# Patient Record
Sex: MALE | Race: WHITE | NOT HISPANIC OR LATINO | Employment: OTHER | URBAN - METROPOLITAN AREA
[De-identification: names, ages, dates, MRNs, and addresses within clinical notes are randomized per-mention and may not be internally consistent; named-entity substitution may affect disease eponyms.]

---

## 2017-04-15 ENCOUNTER — APPOINTMENT (OUTPATIENT)
Dept: RADIOLOGY | Facility: MEDICAL CENTER | Age: 52
End: 2017-04-15
Attending: EMERGENCY MEDICINE
Payer: COMMERCIAL

## 2017-04-15 ENCOUNTER — HOSPITAL ENCOUNTER (EMERGENCY)
Facility: MEDICAL CENTER | Age: 52
End: 2017-04-15
Attending: EMERGENCY MEDICINE
Payer: COMMERCIAL

## 2017-04-15 VITALS
RESPIRATION RATE: 18 BRPM | SYSTOLIC BLOOD PRESSURE: 124 MMHG | DIASTOLIC BLOOD PRESSURE: 72 MMHG | HEIGHT: 74 IN | BODY MASS INDEX: 29.71 KG/M2 | HEART RATE: 58 BPM | OXYGEN SATURATION: 97 % | WEIGHT: 231.48 LBS | TEMPERATURE: 98 F

## 2017-04-15 DIAGNOSIS — N23 URETERAL COLIC: ICD-10-CM

## 2017-04-15 LAB
ALBUMIN SERPL BCP-MCNC: 4.1 G/DL (ref 3.2–4.9)
ALBUMIN/GLOB SERPL: 1.4 G/DL
ALP SERPL-CCNC: 79 U/L (ref 30–99)
ALT SERPL-CCNC: 24 U/L (ref 2–50)
ANION GAP SERPL CALC-SCNC: 9 MMOL/L (ref 0–11.9)
APPEARANCE UR: CLEAR
AST SERPL-CCNC: 27 U/L (ref 12–45)
BASOPHILS # BLD AUTO: 0.8 % (ref 0–1.8)
BASOPHILS # BLD: 0.07 K/UL (ref 0–0.12)
BILIRUB SERPL-MCNC: 0.6 MG/DL (ref 0.1–1.5)
BILIRUB UR QL CFM: NEGATIVE
BUN SERPL-MCNC: 21 MG/DL (ref 8–22)
CALCIUM SERPL-MCNC: 9.1 MG/DL (ref 8.4–10.2)
CHLORIDE SERPL-SCNC: 106 MMOL/L (ref 96–112)
CO2 SERPL-SCNC: 26 MMOL/L (ref 20–33)
COLOR UR: YELLOW
CREAT SERPL-MCNC: 1.13 MG/DL (ref 0.5–1.4)
CULTURE IF INDICATED INDCX: NO UA CULTURE
EOSINOPHIL # BLD AUTO: 0.45 K/UL (ref 0–0.51)
EOSINOPHIL NFR BLD: 4.8 % (ref 0–6.9)
ERYTHROCYTE [DISTWIDTH] IN BLOOD BY AUTOMATED COUNT: 39.8 FL (ref 35.9–50)
GFR SERPL CREATININE-BSD FRML MDRD: >60 ML/MIN/1.73 M 2
GLOBULIN SER CALC-MCNC: 3 G/DL (ref 1.9–3.5)
GLUCOSE SERPL-MCNC: 126 MG/DL (ref 65–99)
GLUCOSE UR STRIP.AUTO-MCNC: NEGATIVE MG/DL
HCT VFR BLD AUTO: 44.6 % (ref 42–52)
HGB BLD-MCNC: 15.3 G/DL (ref 14–18)
IMM GRANULOCYTES # BLD AUTO: 0.02 K/UL (ref 0–0.11)
IMM GRANULOCYTES NFR BLD AUTO: 0.2 % (ref 0–0.9)
KETONES UR STRIP.AUTO-MCNC: ABNORMAL MG/DL
LEUKOCYTE ESTERASE UR QL STRIP.AUTO: NEGATIVE
LIPASE SERPL-CCNC: 20 U/L (ref 7–58)
LYMPHOCYTES # BLD AUTO: 2.61 K/UL (ref 1–4.8)
LYMPHOCYTES NFR BLD: 28 % (ref 22–41)
MCH RBC QN AUTO: 30.2 PG (ref 27–33)
MCHC RBC AUTO-ENTMCNC: 34.3 G/DL (ref 33.7–35.3)
MCV RBC AUTO: 88 FL (ref 81.4–97.8)
MICRO URNS: ABNORMAL
MONOCYTES # BLD AUTO: 0.74 K/UL (ref 0–0.85)
MONOCYTES NFR BLD AUTO: 7.9 % (ref 0–13.4)
MUCOUS THREADS #/AREA URNS HPF: ABNORMAL /HPF
NEUTROPHILS # BLD AUTO: 5.43 K/UL (ref 1.82–7.42)
NEUTROPHILS NFR BLD: 58.3 % (ref 44–72)
NITRITE UR QL STRIP.AUTO: NEGATIVE
NRBC # BLD AUTO: 0 K/UL
NRBC BLD AUTO-RTO: 0 /100 WBC
PH UR STRIP.AUTO: 5.5 [PH]
PLATELET # BLD AUTO: 239 K/UL (ref 164–446)
PMV BLD AUTO: 11.1 FL (ref 9–12.9)
POTASSIUM SERPL-SCNC: 4 MMOL/L (ref 3.6–5.5)
PROT SERPL-MCNC: 7.1 G/DL (ref 6–8.2)
PROT UR QL STRIP: NEGATIVE MG/DL
RBC # BLD AUTO: 5.07 M/UL (ref 4.7–6.1)
RBC # URNS HPF: ABNORMAL /HPF
RBC UR QL AUTO: ABNORMAL
SODIUM SERPL-SCNC: 141 MMOL/L (ref 135–145)
SP GR UR REFRACTOMETRY: 1.03
UNIDENT CRYS URNS QL MICRO: ABNORMAL /HPF
WBC # BLD AUTO: 9.3 K/UL (ref 4.8–10.8)
WBC #/AREA URNS HPF: ABNORMAL /HPF

## 2017-04-15 PROCEDURE — 96375 TX/PRO/DX INJ NEW DRUG ADDON: CPT

## 2017-04-15 PROCEDURE — 81001 URINALYSIS AUTO W/SCOPE: CPT

## 2017-04-15 PROCEDURE — 83690 ASSAY OF LIPASE: CPT

## 2017-04-15 PROCEDURE — 96374 THER/PROPH/DIAG INJ IV PUSH: CPT

## 2017-04-15 PROCEDURE — 74176 CT ABD & PELVIS W/O CONTRAST: CPT

## 2017-04-15 PROCEDURE — 85025 COMPLETE CBC W/AUTO DIFF WBC: CPT

## 2017-04-15 PROCEDURE — 700111 HCHG RX REV CODE 636 W/ 250 OVERRIDE (IP): Performed by: EMERGENCY MEDICINE

## 2017-04-15 PROCEDURE — 96376 TX/PRO/DX INJ SAME DRUG ADON: CPT

## 2017-04-15 PROCEDURE — 36415 COLL VENOUS BLD VENIPUNCTURE: CPT

## 2017-04-15 PROCEDURE — 700105 HCHG RX REV CODE 258: Performed by: EMERGENCY MEDICINE

## 2017-04-15 PROCEDURE — 96361 HYDRATE IV INFUSION ADD-ON: CPT

## 2017-04-15 PROCEDURE — 80053 COMPREHEN METABOLIC PANEL: CPT

## 2017-04-15 PROCEDURE — 99285 EMERGENCY DEPT VISIT HI MDM: CPT

## 2017-04-15 PROCEDURE — 94760 N-INVAS EAR/PLS OXIMETRY 1: CPT

## 2017-04-15 RX ORDER — ONDANSETRON 2 MG/ML
4 INJECTION INTRAMUSCULAR; INTRAVENOUS ONCE
Status: COMPLETED | OUTPATIENT
Start: 2017-04-15 | End: 2017-04-15

## 2017-04-15 RX ORDER — ONDANSETRON 4 MG/1
4 TABLET, ORALLY DISINTEGRATING ORAL EVERY 8 HOURS PRN
Qty: 20 TAB | Refills: 0 | Status: SHIPPED | OUTPATIENT
Start: 2017-04-15 | End: 2020-08-10

## 2017-04-15 RX ORDER — TAMSULOSIN HYDROCHLORIDE 0.4 MG/1
0.4 CAPSULE ORAL DAILY
Qty: 20 CAP | Refills: 0 | Status: SHIPPED | OUTPATIENT
Start: 2017-04-15 | End: 2020-08-10

## 2017-04-15 RX ORDER — MORPHINE SULFATE 4 MG/ML
4 INJECTION, SOLUTION INTRAMUSCULAR; INTRAVENOUS ONCE
Status: DISCONTINUED | OUTPATIENT
Start: 2017-04-15 | End: 2017-04-15

## 2017-04-15 RX ORDER — MORPHINE SULFATE 4 MG/ML
4 INJECTION, SOLUTION INTRAMUSCULAR; INTRAVENOUS ONCE
Status: COMPLETED | OUTPATIENT
Start: 2017-04-15 | End: 2017-04-15

## 2017-04-15 RX ORDER — SODIUM CHLORIDE 9 MG/ML
1000 INJECTION, SOLUTION INTRAVENOUS ONCE
Status: COMPLETED | OUTPATIENT
Start: 2017-04-15 | End: 2017-04-15

## 2017-04-15 RX ORDER — OXYCODONE HYDROCHLORIDE AND ACETAMINOPHEN 5; 325 MG/1; MG/1
1-2 TABLET ORAL EVERY 4 HOURS PRN
Qty: 20 TAB | Refills: 0 | Status: SHIPPED | OUTPATIENT
Start: 2017-04-15 | End: 2020-08-10

## 2017-04-15 RX ORDER — KETOROLAC TROMETHAMINE 30 MG/ML
30 INJECTION, SOLUTION INTRAMUSCULAR; INTRAVENOUS ONCE
Status: COMPLETED | OUTPATIENT
Start: 2017-04-15 | End: 2017-04-15

## 2017-04-15 RX ADMIN — KETOROLAC TROMETHAMINE 30 MG: 30 INJECTION, SOLUTION INTRAMUSCULAR; INTRAVENOUS at 12:52

## 2017-04-15 RX ADMIN — MORPHINE SULFATE 4 MG: 4 INJECTION INTRAVENOUS at 12:53

## 2017-04-15 RX ADMIN — ONDANSETRON 4 MG: 2 INJECTION, SOLUTION INTRAMUSCULAR; INTRAVENOUS at 12:52

## 2017-04-15 RX ADMIN — SODIUM CHLORIDE 1000 ML: 9 INJECTION, SOLUTION INTRAVENOUS at 12:52

## 2017-04-15 RX ADMIN — MORPHINE SULFATE 4 MG: 4 INJECTION INTRAVENOUS at 14:52

## 2017-04-15 ASSESSMENT — PAIN SCALES - GENERAL
PAINLEVEL_OUTOF10: 1
PAINLEVEL_OUTOF10: 5
PAINLEVEL_OUTOF10: 8

## 2017-04-15 NOTE — ED AVS SNAPSHOT
4/15/2017    Cody Wilson  [address] [place] [address]    Dear Cody:    [facility] wants to ensure your discharge home is safe and you or your loved ones have had all of your questions answered regarding your care after you leave the hospital.    Below is a list of resources and contact information should you have any questions regarding your hospital stay, follow-up instructions, or active medical symptoms.    Questions or Concerns Regarding… Contact   Medical Questions Related to Your Discharge  (7 days a week, 8am-5pm) Contact a Nurse Care Coordinator   [phone]   Medical Questions Not Related to Your Discharge  (24 hours a day / 7 days a week)  Contact the Nurse Health Line   [phone]    Medications or Discharge Instructions Refer to your discharge packet   or contact your [facility] Primary Care Provider   [phone]   Follow-up Appointment(s) Schedule your appointment via [employer]   or contact Scheduling [phone]   Billing Review your statement via [employer]  or contact Billing [phone]   Medical Records Review your records via [employer]   or contact Medical Records [phone]     You may receive a telephone call within two days of discharge. This call is to make certain you understand your discharge instructions and have the opportunity to have any questions answered. You can also easily access your medical information, test results and upcoming appointments via the [employer] free online health management tool. You can learn more and sign up at [employer]. For assistance setting up your [employer] account, please call [phone].    Once again, we want to ensure your discharge home is safe and that you have a clear understanding of any next steps in your care. If you have any questions or concerns, please do not hesitate to contact us, we are here for you. Thank you for choosing [facility] for your healthcare needs.    Sincerely,    Your [facility] Healthcare Team

## 2017-04-15 NOTE — ED PROVIDER NOTES
"ED Provider Note    CHIEF COMPLAINT  Chief Complaint   Patient presents with   • Abdominal Pain       HPI  Cody Wilson is a 51 y.o. male who presents with acute onset of severe left lower quadrant abdominal pain that began 45 minutes ago. The patient reports the pain is sharp and intermittent. He has never had pain like this before. He also had nausea and vomiting. He said his brother one month ago was diagnosed with kidney stones, the patient denies any history of kidney stones himself.    REVIEW OF SYSTEMS  See HPI for further details. All other systems are negative.     PAST MEDICAL HISTORY    none    SOCIAL HISTORY  Social History     Social History Main Topics   • Smoking status: Never Smoker    • Smokeless tobacco: Not on file   • Alcohol Use: No   • Drug Use: No   • Sexual Activity: Not on file       SURGICAL HISTORY  patient denies any surgical history    CURRENT MEDICATIONS  Home Medications     Reviewed by Kd Ascencio (Pharmacy Tech) on 04/15/17 at 1412  Med List Status: Complete    Medication Last Dose Status          Patient Gerardo Taking any Medications                          ALLERGIES  No Known Allergies    PHYSICAL EXAM  VITAL SIGNS: /72 mmHg  Pulse 52  Temp(Src) 36.7 °C (98 °F)  Resp 18  Ht 1.88 m (6' 2\")  Wt 105 kg (231 lb 7.7 oz)  BMI 29.71 kg/m2  SpO2 97% @ELENA[[id number]::@   Pulse ox interpretation: I interpret this pulse ox as normal.  Constitutional: Alert, appears uncomfortable from left lower quadrant abdominal pain.  HENT: No signs of trauma, Bilateral external ears normal, Nose normal.   Eyes: Pupils are equal and reactive, Conjunctiva normal, Non-icteric.   Neck: Normal range of motion, No tenderness, Supple, No stridor.   Lymphatic: No lymphadenopathy noted.   Cardiovascular: Regular rate and rhythm, no murmurs.   Thorax & Lungs: Normal breath sounds, No respiratory distress, No wheezing, No chest tenderness.   Abdomen: Bowel sounds normal, Soft, No tenderness, No " masses, No pulsatile masses. No peritoneal signs.  Skin: Warm, Dry, No erythema, No rash.   Extremities: Intact distal pulses, No edema, No tenderness, No cyanosis.  Musculoskeletal: Good range of motion in all major joints. No tenderness to palpation or major deformities noted.   Neurologic: Alert , Normal motor function, Normal sensory function, No focal deficits noted.   Psychiatric: Affect normal, Judgment normal, Mood normal.       DIAGNOSTIC STUDIES / PROCEDURES        LABS  Labs Reviewed   COMP METABOLIC PANEL - Abnormal; Notable for the following:     Glucose 126 (*)     All other components within normal limits   URINALYSIS,CULTURE IF INDICATED - Abnormal; Notable for the following:     Ketones Trace (*)     Occult Blood Large (*)     All other components within normal limits   URINE MICROSCOPIC (W/UA) - Abnormal; Notable for the following:     WBC Rare (*)     RBC 10-20 (*)     All other components within normal limits   CBC WITH DIFFERENTIAL   LIPASE   ESTIMATED GFR   REFRACTOMETER SG   UR BILI ICTOTEST         RADIOLOGY  CT-RENAL COLIC EVALUATION(A/P W/O)   Final Result         1.  3 mm distal left ureteral calculus with left-sided hydronephrosis.      2.  Cholelithiasis.              COURSE & MEDICAL DECISION MAKING  Pertinent Labs & Imaging studies reviewed. (See chart for details)    Differential diagnosis: Ureteral colic, diverticulitis, UTI, abdominal pain of unknown cause    IV was established, he was given 1 L normal saline IV for vomiting and dehydration. He was given Toradol 30 mg IV, Zofran 4 mg IV, and morphine 4 mg IV.    Patient was given additional morphine 4 g IV.    2:56 PM the patient was reassessed, he is feeling more comfortable. He'll be prescribed Flomax, Zofran, and Percocet. He will return to [facility] if he develops fever or worsening symptoms. He will follow-up with his doctor, I have given him a strainer if he would like to have the stone analyzed.    I reviewed prescription  monitoring program for patient's narcotic use before prescribing a scheduled drug.The patient will not drink alcohol nor drive with prescribed medications. The patient will return for new or worsening symptoms and is stable at the time of discharge.    The patient is referred to a primary physician for blood pressure management, diabetic screening, and for all other preventative health concerns.    DISPOSITION:  Patient will be discharged home in stable condition.    FOLLOW UP:  [facility], Emergency Dept  [address] [address] [phone]    If symptoms worsen, fever    Stuart Gunderson M.D.  [address] [id number] [phone]      As needed      OUTPATIENT MEDICATIONS:  New Prescriptions    ONDANSETRON (ZOFRAN ODT) 4 MG TABLET DISPERSIBLE    Take 1 Tab by mouth every 8 hours as needed for Nausea/Vomiting.    OXYCODONE-ACETAMINOPHEN (PERCOCET) 5-325 MG TAB    Take 1-2 Tabs by mouth every four hours as needed (pain). No driving, no drinking alcohol    TAMSULOSIN (FLOMAX) 0.4 MG CAPSULE    Take 1 Cap by mouth every day.           The patient will not drink alcohol nor drive with prescribed medications. The patient will return for worsening symptoms and is stable at the time of discharge. The patient verbalizes understanding and will comply.    FINAL IMPRESSION  1. Acute left ureteral colic  2.   3.         Electronically signed by: Abdiel Elise, 4/15/2017 1:23 PM

## 2017-04-15 NOTE — ED AVS SNAPSHOT
[employer] Access Code: [email or web address]  Expires: 5/15/2017  3:05 PM    Your email address is not on file at [facility]  Email Addresses are required for you to sign up for [employer], please contact [phone] to verify your personal information and to provide your email address prior to attempting to register for [employer].    Cody Wilson  [address] [place] [address] [place] [address]    Echo itt  A secure, online tool to manage your health information     [facility]s [employer]® is a secure, online tool that connects you to your personalized health information from the privacy of your home -- day or night - making it very easy for you to manage your healthcare. Once the activation process is completed, you can even access your medical information using the [employer] sebastian, which is available for free in the Apple Sebastian store or Google Play store.     To learn more about [employer], visit [email or web address]    There are two levels of access available (as shown below):   My Chart Features  [facility] Primary Care Doctor [facility]  Specialists [facility]  Urgent  Care Non-[facility] Primary Care Doctor   Email your healthcare team securely and privately 24/7 X X X    Manage appointments: schedule your next appointment; view details of past/upcoming appointments X      Request prescription refills. X      View recent personal medical records, including lab and immunizations X X X X   View health record, including health history, allergies, medications X X X X   Read reports about your outpatient visits, procedures, consult and ER notes X X X X   See your discharge summary, which is a recap of your hospital and/or ER visit that includes your diagnosis, lab results, and care plan X X  X     How to register for [employer]:  Once your e-mail address has been verified, follow the following steps to sign up for [employer].     1. Go to  [email or web address]  2. Click on the Sign Up Now box, which takes you to the New Member Sign Up page. You will need  to provide the following information:  a. Enter your [employer] Access Code exactly as it appears at the top of this page. (You will not need to use this code after you’ve completed the sign-up process. If you do not sign up before the expiration date, you must request a new code.)   b. Enter your date of birth.   c. Enter your home email address.   d. Click Submit, and follow the next screen’s instructions.  3. Create a [employer] ID. This will be your [employer] login ID and cannot be changed, so think of one that is secure and easy to remember.  4. Create a [employer] password. You can change your password at any time.  5. Enter your Password Reset Question and Answer. This can be used at a later time if you forget your password.   6. Enter your e-mail address. This allows you to receive e-mail notifications when new information is available in [employer].  7. Click Sign Up. You can now view your health information.    For assistance activating your [employer] account, call [phone]

## 2017-04-15 NOTE — ED AVS SNAPSHOT
Home Care Instructions                                                                                                                Cody Wilson   MRN: [id number]    Department:  [facility], Emergency Dept   Date of Visit:  4/15/2017            [facility], Emergency Dept    [address]    [place] [address]    Phone:  [phone]      You were seen by     Abdiel Elise M.D.      Your Diagnosis Was     Ureteral colic     N23       These are the medications you received during your hospitalization from 04/15/2017 1219 to 04/15/2017 1505     Date/Time Order Dose Route Action    04/15/2017 1252 NS infusion 1,000 mL 1,000 mL Intravenous New Bag    04/15/2017 1252 ketorolac (TORADOL) injection 30 mg Intravenous Given    04/15/2017 1253 morphine (pf) 4 mg/ml injection 4 mg 4 mg Intravenous Given    04/15/2017 1252 ondansetron (ZOFRAN) syringe/vial injection 4 mg 4 mg Intravenous Given    04/15/2017 1452 morphine (pf) 4 mg/ml injection 4 mg 4 mg Intravenous Given      Follow-up Information     1. Follow up with [facility], Emergency Dept.    Specialty:  Emergency Medicine    Why:  If symptoms worsen, fever    Contact information    [address] [address] [phone]        2. Follow up with Stuart Gunderson M.D..    Specialty:  Family Medicine    Why:  As needed    Contact information    [address] [address]  [place] [address] [phone]        Medication Information     Review all of your home medications and newly ordered medications with your primary doctor and/or pharmacist as soon as possible. Follow medication instructions as directed by your doctor and/or pharmacist.     Please keep your complete medication list with you and share with your physician. Update the information when medications are discontinued, doses are changed, or new medications (including over-the-counter products) are added; and carry  medication information at all times in the event of emergency situations.               Medication List      START taking these medications        Instructions    Morning Afternoon Evening Bedtime    ondansetron 4 MG Tbdp   Commonly known as:  ZOFRAN ODT        Take 1 Tab by mouth every 8 hours as needed for Nausea/Vomiting.   Dose:  4 mg                        oxycodone-acetaminophen 5-325 MG Tabs   Commonly known as:  PERCOCET        Take 1-2 Tabs by mouth every four hours as needed (pain). No driving, no drinking alcohol   Dose:  1-2 Tab                        tamsulosin 0.4 MG capsule   Commonly known as:  FLOMAX        Take 1 Cap by mouth every day.   Dose:  0.4 mg                             Where to Get Your Medications      You can get these medications from any pharmacy     Bring a paper prescription for each of these medications    - ondansetron 4 MG Tbdp  - oxycodone-acetaminophen 5-325 MG Tabs  - tamsulosin 0.4 MG capsule            Procedures and tests performed during your visit     CBC WITH DIFFERENTIAL    COMP METABOLIC PANEL    CT-RENAL COLIC EVALUATION(A/P W/O)    ESTIMATED GFR    LIPASE    REFRACTOMETER SG    UR BILI ICTOTEST    URINALYSIS CULTURE, IF INDICATED    URINE MICROSCOPIC (W/UA)        Discharge Instructions       Kidney Stones  Kidney stones (urolithiasis) are deposits that form inside your kidneys. The intense pain is caused by the stone moving through the urinary tract. When the stone moves, the ureter goes into spasm around the stone. The stone is usually passed in the urine.   CAUSES   · A disorder that makes certain neck glands produce too much parathyroid hormone (primary hyperparathyroidism).  · A buildup of uric acid crystals, similar to gout in your joints.  · Narrowing (stricture) of the ureter.  · A kidney obstruction present at birth (congenital obstruction).  · Previous surgery on the kidney or ureters.  · Numerous kidney infections.  SYMPTOMS   · Feeling sick to your  stomach (nauseous).  · Throwing up (vomiting).  · Blood in the urine (hematuria).  · Pain that usually spreads (radiates) to the groin.  · Frequency or urgency of urination.  DIAGNOSIS   · Taking a history and physical exam.  · Blood or urine tests.  · CT scan.  · Occasionally, an examination of the inside of the urinary bladder (cystoscopy) is performed.  TREATMENT   · Observation.  · Increasing your fluid intake.  · Extracorporeal shock wave lithotripsy--This is a noninvasive procedure that uses shock waves to break up kidney stones.  · Surgery may be needed if you have severe pain or persistent obstruction. There are various surgical procedures. Most of the procedures are performed with the use of small instruments. Only small incisions are needed to accommodate these instruments, so recovery time is minimized.  The size, location, and chemical composition are all important variables that will determine the proper choice of action for you. Talk to your health care provider to better understand your situation so that you will minimize the risk of injury to yourself and your kidney.   HOME CARE INSTRUCTIONS   · Drink enough water and fluids to keep your urine clear or pale yellow. This will help you to pass the stone or stone fragments.  · Strain all urine through the provided strainer. Keep all particulate matter and stones for your health care provider to see. The stone causing the pain may be as small as a grain of salt. It is very important to use the strainer each and every time you pass your urine. The collection of your stone will allow your health care provider to analyze it and verify that a stone has actually passed. The stone analysis will often identify what you can do to reduce the incidence of recurrences.  · Only take over-the-counter or prescription medicines for pain, discomfort, or fever as directed by your health care provider.  · Make a follow-up appointment with your health care provider as  directed.  · Get follow-up X-rays if required. The absence of pain does not always mean that the stone has passed. It may have only stopped moving. If the urine remains completely obstructed, it can cause loss of kidney function or even complete destruction of the kidney. It is your responsibility to make sure X-rays and follow-ups are completed. Ultrasounds of the kidney can show blockages and the status of the kidney. Ultrasounds are not associated with any radiation and can be performed easily in a matter of minutes.  SEEK MEDICAL CARE IF:  · You experience pain that is progressive and unresponsive to any pain medicine you have been prescribed.  SEEK IMMEDIATE MEDICAL CARE IF:   · Pain cannot be controlled with the prescribed medicine.  · You have a fever or shaking chills.  · The severity or intensity of pain increases over 18 hours and is not relieved by pain medicine.  · You develop a new onset of abdominal pain.  · You feel faint or pass out.  · You are unable to urinate.  MAKE SURE YOU:   · Understand these instructions.  · Will watch your condition.  · Will get help right away if you are not doing well or get worse.     This information is not intended to replace advice given to you by your health care provider. Make sure you discuss any questions you have with your health care provider.     Document Released: 12/18/2006 Document Revised: 01/08/2016 Document Reviewed: 05/21/2014  Elsevier Interactive Patient Education ©2016 [employer]            Patient Information     Patient Information    Following emergency treatment: all patient requiring follow-up care must return either to a private physician or a clinic if your condition worsens before you are able to obtain further medical attention, please return to the emergency room.     Billing Information    At [facility], we work to make the billing process streamlined for our patients.  Our Representatives are here to answer any questions you may have  regarding your hospital bill.  If you have insurance coverage and have supplied your insurance information to us, we will submit a claim to your insurer on your behalf.  Should you have any questions regarding your bill, we can be reached online or by phone as follows:  Online: You are able pay your bills online or live chat with our representatives about any billing questions you may have. We are here to help Monday - Friday from 8:00am to 7:30pm and 9:00am - 12:00pm on Saturdays.  Please visit [email or web address]  for more information.   Phone:  [phone] or [phone]    Please note that your emergency physician, surgeon, pathologist, radiologist, anesthesiologist, and other specialists are not employed by [facility] and will therefore bill separately for their services.  Please contact them directly for any questions concerning their bills at the numbers below:     Emergency Physician Services:  [phone]  [facility]:  [phone]  [facility]:  [phone]  [facility]:  [phone]    1. Your final bill may vary from the amount quoted upon discharge if all procedures are not complete at that time, or if your doctor has additional procedures of which we are not aware. You will receive an additional bill if you return to the Emergency Department at [facility] for suture removal regardless of the facility of which the sutures were placed.     2. Please arrange for settlement of this account at the emergency registration.    3. All self-pay accounts are due in full at the time of treatment.  If you are unable to meet this obligation then payment is expected within 4-5 days.     4. If you have had radiology studies (CT, X-ray, Ultrasound, MRI), you have received a preliminary result during your emergency department visit. Please contact the radiology department [phone] to receive a copy of your final result.  Please discuss the Final result with your primary physician or with the follow up physician provided.     Crisis Hotline:  [place] Crisis Hotline:  4-074-UQGHAPR or 1-820.672.7516  [place] Crisis Hotline:    1-390.245.7093 or 269-291-3995         ED Discharge Follow Up Questions    1. In order to provide you with very good care, we would like to follow up with a phone call in the next few days.  May we have your permission to contact you?     YES /  NO    2. What is the best phone number to call you? (       )_____-__________    3. What is the best time to call you?      Morning  /  Afternoon  /  Evening                   Patient Signature:  ____________________________________________________________    Date:  ____________________________________________________________

## 2017-04-15 NOTE — DISCHARGE INSTRUCTIONS
Kidney Stones  Kidney stones (urolithiasis) are deposits that form inside your kidneys. The intense pain is caused by the stone moving through the urinary tract. When the stone moves, the ureter goes into spasm around the stone. The stone is usually passed in the urine.   CAUSES   · A disorder that makes certain neck glands produce too much parathyroid hormone (primary hyperparathyroidism).  · A buildup of uric acid crystals, similar to gout in your joints.  · Narrowing (stricture) of the ureter.  · A kidney obstruction present at birth (congenital obstruction).  · Previous surgery on the kidney or ureters.  · Numerous kidney infections.  SYMPTOMS   · Feeling sick to your stomach (nauseous).  · Throwing up (vomiting).  · Blood in the urine (hematuria).  · Pain that usually spreads (radiates) to the groin.  · Frequency or urgency of urination.  DIAGNOSIS   · Taking a history and physical exam.  · Blood or urine tests.  · CT scan.  · Occasionally, an examination of the inside of the urinary bladder (cystoscopy) is performed.  TREATMENT   · Observation.  · Increasing your fluid intake.  · Extracorporeal shock wave lithotripsy--This is a noninvasive procedure that uses shock waves to break up kidney stones.  · Surgery may be needed if you have severe pain or persistent obstruction. There are various surgical procedures. Most of the procedures are performed with the use of small instruments. Only small incisions are needed to accommodate these instruments, so recovery time is minimized.  The size, location, and chemical composition are all important variables that will determine the proper choice of action for you. Talk to your health care provider to better understand your situation so that you will minimize the risk of injury to yourself and your kidney.   HOME CARE INSTRUCTIONS   · Drink enough water and fluids to keep your urine clear or pale yellow. This will help you to pass the stone or stone fragments.  · Strain  all urine through the provided strainer. Keep all particulate matter and stones for your health care provider to see. The stone causing the pain may be as small as a grain of salt. It is very important to use the strainer each and every time you pass your urine. The collection of your stone will allow your health care provider to analyze it and verify that a stone has actually passed. The stone analysis will often identify what you can do to reduce the incidence of recurrences.  · Only take over-the-counter or prescription medicines for pain, discomfort, or fever as directed by your health care provider.  · Make a follow-up appointment with your health care provider as directed.  · Get follow-up X-rays if required. The absence of pain does not always mean that the stone has passed. It may have only stopped moving. If the urine remains completely obstructed, it can cause loss of kidney function or even complete destruction of the kidney. It is your responsibility to make sure X-rays and follow-ups are completed. Ultrasounds of the kidney can show blockages and the status of the kidney. Ultrasounds are not associated with any radiation and can be performed easily in a matter of minutes.  SEEK MEDICAL CARE IF:  · You experience pain that is progressive and unresponsive to any pain medicine you have been prescribed.  SEEK IMMEDIATE MEDICAL CARE IF:   · Pain cannot be controlled with the prescribed medicine.  · You have a fever or shaking chills.  · The severity or intensity of pain increases over 18 hours and is not relieved by pain medicine.  · You develop a new onset of abdominal pain.  · You feel faint or pass out.  · You are unable to urinate.  MAKE SURE YOU:   · Understand these instructions.  · Will watch your condition.  · Will get help right away if you are not doing well or get worse.     This information is not intended to replace advice given to you by your health care provider. Make sure you discuss any  questions you have with your health care provider.     Document Released: 12/18/2006 Document Revised: 01/08/2016 Document Reviewed: 05/21/2014  [employer] Interactive Patient Education ©2016 [employer]

## 2017-04-15 NOTE — ED NOTES
Strainer given to pt , D/c pt home, rx given . Pt aware of f/u instructions , aware to return for any changes or concerns. No further questions upon d/c home from ed

## 2017-04-15 NOTE — ED NOTES
Reports acute onset of LLQ abdominal pain for the past 1/2 hour with one episode of nausea/vomiting.

## 2017-04-15 NOTE — ED NOTES
ERP at bedside. Pt agrees with plan of care discussed by ERP. AIDET acknowledged with patient. IV established. Blood sent to lab. Medicinal interventions carried out per ERP orders.  Dannyrjason in low position, side rail up for pt safety. Call light within reach. Will continue to monitor.

## 2017-04-27 ENCOUNTER — HOSPITAL ENCOUNTER (OUTPATIENT)
Facility: MEDICAL CENTER | Age: 52
End: 2017-04-27
Attending: FAMILY MEDICINE
Payer: COMMERCIAL

## 2017-04-27 PROCEDURE — 82365 CALCULUS SPECTROSCOPY: CPT

## 2017-04-28 ENCOUNTER — HOSPITAL ENCOUNTER (OUTPATIENT)
Dept: LAB | Facility: MEDICAL CENTER | Age: 52
End: 2017-04-28
Attending: FAMILY MEDICINE
Payer: COMMERCIAL

## 2017-04-28 LAB
ALBUMIN SERPL BCP-MCNC: 4.3 G/DL (ref 3.2–4.9)
ALBUMIN/GLOB SERPL: 1.5 G/DL
ALP SERPL-CCNC: 79 U/L (ref 30–99)
ALT SERPL-CCNC: 15 U/L (ref 2–50)
AMBIGUOUS DTTM AMBI4: NORMAL
ANION GAP SERPL CALC-SCNC: 5 MMOL/L (ref 0–11.9)
AST SERPL-CCNC: 16 U/L (ref 12–45)
BILIRUB SERPL-MCNC: 0.8 MG/DL (ref 0.1–1.5)
BUN SERPL-MCNC: 19 MG/DL (ref 8–22)
CALCIUM SERPL-MCNC: 9.2 MG/DL (ref 8.5–10.5)
CHLORIDE SERPL-SCNC: 108 MMOL/L (ref 96–112)
CHOLEST SERPL-MCNC: 158 MG/DL (ref 100–199)
CO2 SERPL-SCNC: 28 MMOL/L (ref 20–33)
CREAT SERPL-MCNC: 0.99 MG/DL (ref 0.5–1.4)
GFR SERPL CREATININE-BSD FRML MDRD: >60 ML/MIN/1.73 M 2
GLOBULIN SER CALC-MCNC: 2.9 G/DL (ref 1.9–3.5)
GLUCOSE SERPL-MCNC: 97 MG/DL (ref 65–99)
HDLC SERPL-MCNC: 46 MG/DL
LDLC SERPL CALC-MCNC: 91 MG/DL
POTASSIUM SERPL-SCNC: 4.3 MMOL/L (ref 3.6–5.5)
PROT SERPL-MCNC: 7.2 G/DL (ref 6–8.2)
SODIUM SERPL-SCNC: 141 MMOL/L (ref 135–145)
TRIGL SERPL-MCNC: 103 MG/DL (ref 0–149)

## 2017-04-28 PROCEDURE — 80061 LIPID PANEL: CPT

## 2017-04-28 PROCEDURE — 36415 COLL VENOUS BLD VENIPUNCTURE: CPT

## 2017-04-28 PROCEDURE — 80053 COMPREHEN METABOLIC PANEL: CPT

## 2017-05-03 LAB
APPEARANCE STONE: NORMAL
COMPN STONE: NORMAL
NUMBER STONE: 1
SIZE STONE: NORMAL MM
SPECIMEN WT: 16 MG

## 2017-06-01 ENCOUNTER — HOSPITAL ENCOUNTER (OUTPATIENT)
Dept: LAB | Facility: MEDICAL CENTER | Age: 52
End: 2017-06-01
Attending: PODIATRIST
Payer: COMMERCIAL

## 2017-06-01 LAB — URATE SERPL-MCNC: 7.1 MG/DL (ref 2.5–8.3)

## 2017-06-01 PROCEDURE — 36415 COLL VENOUS BLD VENIPUNCTURE: CPT

## 2017-06-01 PROCEDURE — 84550 ASSAY OF BLOOD/URIC ACID: CPT

## 2017-07-20 ENCOUNTER — APPOINTMENT (OUTPATIENT)
Dept: SOCIAL WORK | Facility: CLINIC | Age: 52
End: 2017-07-20

## 2017-07-20 PROCEDURE — 90736 HZV VACCINE LIVE SUBQ: CPT | Performed by: REGISTERED NURSE

## 2017-09-08 ENCOUNTER — HOSPITAL ENCOUNTER (OUTPATIENT)
Dept: RADIOLOGY | Facility: MEDICAL CENTER | Age: 52
End: 2017-09-08
Attending: FAMILY MEDICINE
Payer: COMMERCIAL

## 2017-09-08 DIAGNOSIS — M54.5 LOW BACK PAIN, UNSPECIFIED BACK PAIN LATERALITY, UNSPECIFIED CHRONICITY, WITH SCIATICA PRESENCE UNSPECIFIED: ICD-10-CM

## 2017-09-08 PROCEDURE — 72110 X-RAY EXAM L-2 SPINE 4/>VWS: CPT

## 2017-09-08 PROCEDURE — 72050 X-RAY EXAM NECK SPINE 4/5VWS: CPT

## 2017-09-23 ENCOUNTER — APPOINTMENT (OUTPATIENT)
Dept: LAB | Facility: MEDICAL CENTER | Age: 52
End: 2017-09-23
Attending: PEDIATRICS
Payer: COMMERCIAL

## 2017-11-02 ENCOUNTER — HOSPITAL ENCOUNTER (OUTPATIENT)
Dept: RADIOLOGY | Facility: MEDICAL CENTER | Age: 52
End: 2017-11-02
Attending: FAMILY MEDICINE
Payer: COMMERCIAL

## 2017-11-02 DIAGNOSIS — M54.5 LOW BACK PAIN, UNSPECIFIED BACK PAIN LATERALITY, UNSPECIFIED CHRONICITY, WITH SCIATICA PRESENCE UNSPECIFIED: ICD-10-CM

## 2017-11-02 PROCEDURE — 72148 MRI LUMBAR SPINE W/O DYE: CPT

## 2018-06-06 ENCOUNTER — HOSPITAL ENCOUNTER (EMERGENCY)
Facility: MEDICAL CENTER | Age: 53
End: 2018-06-06
Attending: EMERGENCY MEDICINE
Payer: COMMERCIAL

## 2018-06-06 ENCOUNTER — APPOINTMENT (OUTPATIENT)
Dept: RADIOLOGY | Facility: MEDICAL CENTER | Age: 53
End: 2018-06-06
Attending: EMERGENCY MEDICINE
Payer: COMMERCIAL

## 2018-06-06 VITALS
SYSTOLIC BLOOD PRESSURE: 124 MMHG | HEIGHT: 74 IN | TEMPERATURE: 98.3 F | RESPIRATION RATE: 16 BRPM | OXYGEN SATURATION: 95 % | DIASTOLIC BLOOD PRESSURE: 82 MMHG | HEART RATE: 89 BPM | WEIGHT: 232.81 LBS | BODY MASS INDEX: 29.88 KG/M2

## 2018-06-06 DIAGNOSIS — S61.215A LACERATION OF LEFT RING FINGER WITHOUT FOREIGN BODY WITHOUT DAMAGE TO NAIL, INITIAL ENCOUNTER: ICD-10-CM

## 2018-06-06 PROCEDURE — 90715 TDAP VACCINE 7 YRS/> IM: CPT | Performed by: EMERGENCY MEDICINE

## 2018-06-06 PROCEDURE — 73140 X-RAY EXAM OF FINGER(S): CPT | Mod: LT

## 2018-06-06 PROCEDURE — 99283 EMERGENCY DEPT VISIT LOW MDM: CPT

## 2018-06-06 PROCEDURE — 304999 HCHG REPAIR-SIMPLE/INTERMED LEVEL 1

## 2018-06-06 PROCEDURE — 303747 HCHG EXTRA SUTURE

## 2018-06-06 PROCEDURE — 700111 HCHG RX REV CODE 636 W/ 250 OVERRIDE (IP)

## 2018-06-06 PROCEDURE — 90471 IMMUNIZATION ADMIN: CPT

## 2018-06-06 PROCEDURE — 700111 HCHG RX REV CODE 636 W/ 250 OVERRIDE (IP): Performed by: EMERGENCY MEDICINE

## 2018-06-06 PROCEDURE — 304217 HCHG IRRIGATION SYSTEM

## 2018-06-06 PROCEDURE — 700101 HCHG RX REV CODE 250

## 2018-06-06 RX ORDER — LIDOCAINE HYDROCHLORIDE 20 MG/ML
20 INJECTION, SOLUTION INFILTRATION; PERINEURAL ONCE
Status: COMPLETED | OUTPATIENT
Start: 2018-06-06 | End: 2018-06-06

## 2018-06-06 RX ORDER — HYDROCODONE BITARTRATE AND ACETAMINOPHEN 7.5; 325 MG/1; MG/1
1 TABLET ORAL EVERY 6 HOURS PRN
Qty: 10 TAB | Refills: 0 | Status: SHIPPED | OUTPATIENT
Start: 2018-06-06 | End: 2018-06-11

## 2018-06-06 RX ORDER — BUPIVACAINE HYDROCHLORIDE 2.5 MG/ML
10 INJECTION, SOLUTION EPIDURAL; INFILTRATION; INTRACAUDAL ONCE
Status: COMPLETED | OUTPATIENT
Start: 2018-06-06 | End: 2018-06-06

## 2018-06-06 RX ORDER — BUPIVACAINE HYDROCHLORIDE 2.5 MG/ML
INJECTION, SOLUTION EPIDURAL; INFILTRATION; INTRACAUDAL
Status: COMPLETED
Start: 2018-06-06 | End: 2018-06-06

## 2018-06-06 RX ORDER — CEPHALEXIN 500 MG/1
500 CAPSULE ORAL 4 TIMES DAILY
Qty: 28 CAP | Refills: 0 | Status: SHIPPED | OUTPATIENT
Start: 2018-06-06 | End: 2018-06-13

## 2018-06-06 RX ORDER — LIDOCAINE HYDROCHLORIDE 20 MG/ML
INJECTION, SOLUTION INFILTRATION; PERINEURAL
Status: COMPLETED
Start: 2018-06-06 | End: 2018-06-06

## 2018-06-06 RX ADMIN — BUPIVACAINE HYDROCHLORIDE 10 ML: 2.5 INJECTION, SOLUTION EPIDURAL; INFILTRATION; INTRACAUDAL; PERINEURAL at 16:45

## 2018-06-06 RX ADMIN — LIDOCAINE HYDROCHLORIDE 20 ML: 20 INJECTION, SOLUTION INFILTRATION; PERINEURAL at 16:45

## 2018-06-06 RX ADMIN — BUPIVACAINE HYDROCHLORIDE 10 ML: 2.5 INJECTION, SOLUTION EPIDURAL; INFILTRATION; INTRACAUDAL at 16:45

## 2018-06-06 RX ADMIN — CLOSTRIDIUM TETANI TOXOID ANTIGEN (FORMALDEHYDE INACTIVATED), CORYNEBACTERIUM DIPHTHERIAE TOXOID ANTIGEN (FORMALDEHYDE INACTIVATED), BORDETELLA PERTUSSIS TOXOID ANTIGEN (GLUTARALDEHYDE INACTIVATED), BORDETELLA PERTUSSIS FILAMENTOUS HEMAGGLUTININ ANTIGEN (FORMALDEHYDE INACTIVATED), BORDETELLA PERTUSSIS PERTACTIN ANTIGEN, AND BORDETELLA PERTUSSIS FIMBRIAE 2/3 ANTIGEN 0.5 ML: 5; 2; 2.5; 5; 3; 5 INJECTION, SUSPENSION INTRAMUSCULAR at 16:45

## 2018-06-06 ASSESSMENT — PAIN SCALES - GENERAL: PAINLEVEL_OUTOF10: 8

## 2018-06-06 NOTE — ED PROVIDER NOTES
"ED Provider Note    CHIEF COMPLAINT  Chief Complaint   Patient presents with   • T-5000 Lacerations     table saw to left 5th finger. Sent from urgent care       HPI  Cody Wilson is a 52 y.o. male who presents for evaluation of finger laceration.  The patient was working with a table saw when he inadvertently cut his left 5th finger over the distal pad surface.  The patient was sent here from the urgent care for evaluation.  The patient denies any recent illness.  No other injuries or complaints.    REVIEW OF SYSTEMS  See HPI for further details.  He denies major health problems such as: Diabetes, seizures, cardiopulmonary disorders, gastrointestinal disorder;    PAST MEDICAL HISTORY  No past medical history on file.    FAMILY HISTORY  No family history on file.    SOCIAL HISTORY  Resides locally;    SURGICAL HISTORY  No past surgical history on file.    CURRENT MEDICATIONS  See nurse's notes    ALLERGIES  No Known Allergies    PHYSICAL EXAM  VITAL SIGNS: /82   Pulse 89   Temp 36.8 °C (98.3 °F)   Resp 16   Ht 1.88 m (6' 2\")   Wt 105.6 kg (232 lb 12.9 oz)   SpO2 95%   BMI 29.89 kg/m²    Constitutional: 52-year-old male, Well developed, Well nourished, sitting fairly comfortably   HENT: Normocephalic, Atraumatic,   Cardiovascular: Regular rate and rhythm without mumurs, gallups, rubs   Thorax & Lungs: Normal Equal breath sounds, No respiratory distress, No wheezing, no stridor, no rales. No chest tenderness.   Musculoskeletal: Left hand: No trauma identified except the 5th finger; left 5th finger: 2.5 cm laceration to the pad surface of the distal phalanx; the FDS and FDP are intact along with no pain with loading of the flexor tendons; decreased sensation over the ulnar aspect of the finger but is able to feel light touch;  Neurologic: Alert & oriented x 3,  No gross focal deficits noted.     RADIOLOGY/PROCEDURES  DX-FINGER(S) 2+ LEFT   Final Result      No evidence of bony injury.            COURSE & " MEDICAL DECISION MAKING  Pertinent Labs & Imaging studies reviewed. (See chart for details)  1.  Tetanus    Procedure note: A digital block with a combination of 1% lidocaine and 0.25% bupivacaine was utilized.  2 mL was infiltrated the side of the digit under sterile technique.  Good anesthesia was obtained.  The wound was irrigated with high-pressure normal saline.  Laceration was closed with 4-0 Ethilon.  6 interrupted sutures were placed.  The wound was cleansed and dressed.  No complications.    Discussion: At this time, the patient presents with a laceration to his 5th finger.  Treatment was initiated as noted above.  There was small defect distally and will need to heal by secondary intention.  Did speak with hand surgery on-call, Dr. Witt.  He will follow the patient up next week and reassess.  I discussed the findings and treatment plan with the patient.  He indicates he is comfortable with this explanation and disposition.      FINAL IMPRESSION  1. Laceration of left ring finger without foreign body without damage to nail, initial encounter        PLAN  1.  Appropriate discharge instructions given  2.  Keflex 500 mg #28  3.  Lortab 7.5 mg #10; [facility] databank was reviewed and informed consent obtained  5.  Follow-up with Dr. Witt next week    Electronically signed by: Guy G Gansert, 6/6/2018 4:38 PM

## 2018-06-06 NOTE — ED TRIAGE NOTES
Chief Complaint   Patient presents with   • T-5000 Lacerations     table saw to left 5th finger. Sent from urgent care     Ambulatory to triage for above. VSS. Unsure of last tdap. Explained triage process, to waiting room. Asked to inform RN if questions or concerns arise.

## 2018-06-07 NOTE — DISCHARGE INSTRUCTIONS
Fingertip Injuries and Amputations  Fingertip injuries are common and often get injured because they are last to escape when pulling your hand out of harm's way. You have amputated (cut off) part of your finger. How this turns out depends largely on how much was amputated. If just the tip is amputated, often the end of the finger will grow back and the finger may return to much the same as it was before the injury.   If more of the finger is missing, your caregiver has done the best with the tissue remaining to allow you to keep as much finger as is possible. Your caregiver after checking your injury has tried to leave you with a painless fingertip that has durable, feeling skin. If possible, your caregiver has tried to maintain the finger's length and appearance and preserve its fingernail.   Please read the instructions outlined below and refer to this sheet in the next few weeks. These instructions provide you with general information on caring for yourself. Your caregiver may also give you specific instructions. While your treatment has been done according to the most current medical practices available, unavoidable complications occasionally occur. If you have any problems or questions after discharge, please call your caregiver.  HOME CARE INSTRUCTIONS   · You may resume normal diet and activities as directed or allowed.  · Keep your hand elevated above the level of your heart. This helps decrease pain and swelling.  · Keep ice packs (or a bag of ice wrapped in a towel) on the injured area for 15-20 minutes, 3-4 times per day, for the first two days.  · Change dressings if necessary or as directed.  · Clean the wound daily or as directed.  · Only take over-the-counter or prescription medicines for pain, discomfort, or fever as directed by your caregiver.  · Keep appointments as directed.  SEEK IMMEDIATE MEDICAL CARE IF:  · You develop redness, swelling, numbness or increasing pain in the wound.  · There is pus  coming from the wound.  · You develop an unexplained oral temperature above 102° F (38.9° C) or as your caregiver suggests.  · There is a foul (bad) smell coming from the wound or dressing.  · There is a breaking open of the wound (edges not staying together) after sutures or staples have been removed.  MAKE SURE YOU:   · Understand these instructions.  · Will watch your condition.  · Will get help right away if you are not doing well or get worse.  Document Released: 11/08/2006 Document Revised: 03/11/2013 Document Reviewed: 10/07/2009  [employer]® Patient Information ©2014 [employer].      Facial Laceration  A facial laceration is a cut on the face. These injuries can be painful and cause bleeding. Some cuts may need to be closed with stitches (sutures), skin adhesive strips, or wound glue. Cuts usually heal quickly but can leave a scar. It can take 1-2 years for the scar to go away completely.  Follow these instructions at home:  · Only take medicines as told by your doctor.  · Follow your doctor's instructions for wound care.  For Stitches:  · Keep the cut clean and dry.  · If you have a bandage (dressing), change it at least once a day. Change the bandage if it gets wet or dirty, or as told by your doctor.  · Wash the cut with soap and water 2 times a day. Rinse the cut with water. Pat it dry with a clean towel.  · Put a thin layer of medicated cream on the cut as told by your doctor.  · You may shower after the first 24 hours. Do not soak the cut in water until the stitches are removed.  · Have your stitches removed as told by your doctor.  · Do not wear any makeup until a few days after your stitches are removed.  For Skin Adhesive Strips:  · Keep the cut clean and dry.  · Do not get the strips wet. You may take a bath, but be careful to keep the cut dry.  · If the cut gets wet, pat it dry with a clean towel.  · The strips will fall off on their own. Do not remove the strips that are still stuck to the  cut.  For Wound Glue:  · You may shower or take baths. Do not soak or scrub the cut. Do not swim. Avoid heavy sweating until the glue falls off on its own. After a shower or bath, pat the cut dry with a clean towel.  · Do not put medicine or makeup on your cut until the glue falls off.  · If you have a bandage, do not put tape over the glue.  · Avoid lots of sunlight or tanning lamps until the glue falls off.  · The glue will fall off on its own in 5-10 days. Do not pick at the glue.  After Healing:  · Put sunscreen on the cut for the first year to reduce your scar.  Contact a doctor if:  · You have a fever.  Get help right away if:  · Your cut area gets red, painful, or puffy (swollen).  · You see a yellowish-white fluid (pus) coming from the cut.  This information is not intended to replace advice given to you by your health care provider. Make sure you discuss any questions you have with your health care provider.  Document Released: 06/05/2009 Document Revised: 05/25/2017 Document Reviewed: 07/31/2014  [employer] Interactive Patient Education © 2017 [employer]

## 2018-06-07 NOTE — ED NOTES
Xrays and suturing complete. Wound dressed w/ abx ointment, adaptic, and tube gauze. Lac care reviewed w/ pt. Pt decline narcotic rx. Given rx for abx. Pt states understanding of dc instructions and f/u care. Pt able to amb out w/ steady gait.

## 2018-06-15 NOTE — DOCUMENTATION QUERY
DOCUMENTATION QUERY    PROVIDERS: Please select “Cosign w/ note”to reply to query.     To better represent the severity of illness of your patient, please review the following information and exercise your independent professional judgment in responding to this query.      Two different fingers are listed for the laceration site.  Fifth finger is documented many times in the ED report, but also Ring finger in the Impression portion of the ED report. Based upon the clinical findings, risk factors, and treatment, please specify the finger the laceration was on.     · Fifth finger  · Ring finger            The medical record reflects the following:   Clinical Findings Laceration of left ring finger without foreign body without damage to nail, initial encounter    Treatment    Risk Factors    Location within medical record  Impression      Thank you,   Kathleen Chandler

## 2018-06-19 NOTE — DOCUMENTATION QUERY
DOCUMENTATION QUERY   Dr. Gansert,     Query is being re-sent because the query was signed but not answered.      PROVIDERS: Please select “Cosign w/ note”to reply to query.     To better represent the severity of illness of your patient, please review the following information and exercise your independent professional judgment in responding to this query.      Two different fingers are listed for the laceration site.  Fifth finger is documented many times in the ED report, but also Ring finger in the Impression portion of the ED report. Based upon the clinical findings, risk factors, and treatment, please specify the finger the laceration was on.     · Fifth finger  · Ring finger            The medical record reflects the following:   Clinical Findings Laceration of left ring finger without foreign body without damage to nail, initial encounter    Treatment     Risk Factors     Location within medical record  Impression      Thank you,   Kathleen Chandler

## 2018-12-28 ENCOUNTER — HOSPITAL ENCOUNTER (OUTPATIENT)
Dept: LAB | Facility: MEDICAL CENTER | Age: 53
End: 2018-12-28
Attending: FAMILY MEDICINE
Payer: COMMERCIAL

## 2018-12-28 LAB
ALBUMIN SERPL BCP-MCNC: 4.2 G/DL (ref 3.2–4.9)
ALBUMIN/GLOB SERPL: 1.4 G/DL
ALP SERPL-CCNC: 81 U/L (ref 30–99)
ALT SERPL-CCNC: 25 U/L (ref 2–50)
ANION GAP SERPL CALC-SCNC: 7 MMOL/L (ref 0–11.9)
AST SERPL-CCNC: 21 U/L (ref 12–45)
BILIRUB SERPL-MCNC: 0.6 MG/DL (ref 0.1–1.5)
BUN SERPL-MCNC: 24 MG/DL (ref 8–22)
CALCIUM SERPL-MCNC: 9.9 MG/DL (ref 8.5–10.5)
CHLORIDE SERPL-SCNC: 106 MMOL/L (ref 96–112)
CO2 SERPL-SCNC: 28 MMOL/L (ref 20–33)
CREAT SERPL-MCNC: 0.97 MG/DL (ref 0.5–1.4)
FASTING STATUS PATIENT QL REPORTED: NORMAL
GLOBULIN SER CALC-MCNC: 3 G/DL (ref 1.9–3.5)
GLUCOSE SERPL-MCNC: 78 MG/DL (ref 65–99)
POTASSIUM SERPL-SCNC: 4.3 MMOL/L (ref 3.6–5.5)
PROT SERPL-MCNC: 7.2 G/DL (ref 6–8.2)
SODIUM SERPL-SCNC: 141 MMOL/L (ref 135–145)

## 2018-12-28 PROCEDURE — 84443 ASSAY THYROID STIM HORMONE: CPT

## 2018-12-28 PROCEDURE — 84439 ASSAY OF FREE THYROXINE: CPT

## 2018-12-28 PROCEDURE — 36415 COLL VENOUS BLD VENIPUNCTURE: CPT

## 2018-12-28 PROCEDURE — 80053 COMPREHEN METABOLIC PANEL: CPT

## 2018-12-29 LAB
T4 FREE SERPL-MCNC: 0.69 NG/DL (ref 0.53–1.43)
TSH SERPL DL<=0.005 MIU/L-ACNC: 2.29 UIU/ML (ref 0.38–5.33)

## 2019-01-04 ENCOUNTER — HOSPITAL ENCOUNTER (OUTPATIENT)
Dept: LAB | Facility: MEDICAL CENTER | Age: 54
End: 2019-01-04
Attending: FAMILY MEDICINE
Payer: COMMERCIAL

## 2019-01-04 LAB
ALBUMIN SERPL BCP-MCNC: 4.1 G/DL (ref 3.2–4.9)
ALBUMIN/GLOB SERPL: 1.4 G/DL
ALP SERPL-CCNC: 79 U/L (ref 30–99)
ALT SERPL-CCNC: 19 U/L (ref 2–50)
ANION GAP SERPL CALC-SCNC: 6 MMOL/L (ref 0–11.9)
AST SERPL-CCNC: 18 U/L (ref 12–45)
BILIRUB SERPL-MCNC: 0.8 MG/DL (ref 0.1–1.5)
BUN SERPL-MCNC: 20 MG/DL (ref 8–22)
CALCIUM SERPL-MCNC: 9.9 MG/DL (ref 8.5–10.5)
CHLORIDE SERPL-SCNC: 108 MMOL/L (ref 96–112)
CHOLEST SERPL-MCNC: 157 MG/DL (ref 100–199)
CO2 SERPL-SCNC: 27 MMOL/L (ref 20–33)
CREAT SERPL-MCNC: 1.05 MG/DL (ref 0.5–1.4)
FASTING STATUS PATIENT QL REPORTED: NORMAL
GLOBULIN SER CALC-MCNC: 3 G/DL (ref 1.9–3.5)
GLUCOSE SERPL-MCNC: 101 MG/DL (ref 65–99)
HDLC SERPL-MCNC: 44 MG/DL
LDLC SERPL CALC-MCNC: 103 MG/DL
POTASSIUM SERPL-SCNC: 4.5 MMOL/L (ref 3.6–5.5)
PROT SERPL-MCNC: 7.1 G/DL (ref 6–8.2)
SODIUM SERPL-SCNC: 141 MMOL/L (ref 135–145)
TRIGL SERPL-MCNC: 51 MG/DL (ref 0–149)

## 2019-01-04 PROCEDURE — 36415 COLL VENOUS BLD VENIPUNCTURE: CPT

## 2019-01-04 PROCEDURE — 80061 LIPID PANEL: CPT

## 2019-01-04 PROCEDURE — 80053 COMPREHEN METABOLIC PANEL: CPT

## 2019-02-05 ENCOUNTER — APPOINTMENT (OUTPATIENT)
Dept: BEHAVIORAL HEALTH | Facility: CLINIC | Age: 54
End: 2019-02-05
Payer: COMMERCIAL

## 2019-11-18 ENCOUNTER — OFFICE VISIT (OUTPATIENT)
Dept: BEHAVIORAL HEALTH | Facility: CLINIC | Age: 54
End: 2019-11-18
Payer: COMMERCIAL

## 2019-11-18 DIAGNOSIS — F33.1 MODERATE EPISODE OF RECURRENT MAJOR DEPRESSIVE DISORDER (HCC): ICD-10-CM

## 2019-11-18 PROCEDURE — 90791 PSYCH DIAGNOSTIC EVALUATION: CPT | Performed by: PSYCHOLOGIST

## 2019-11-18 NOTE — BH THERAPY
[facility]  INITIAL ASSESSMENT    Name: Cody Wilson  MRN: [id number]  : 1965  Age: 53 y.o.  Date of assessment: 2019  PCP: Wilfrido Salas D.O.  Persons in attendance: Patient  Total session time: 45 minutes      CHIEF COMPLAINT AND HISTORY OF PRESENTING PROBLEM:  (as stated by Patient):  Cody Wilson is a 53 year old, white male  The patient self-referred and voluntarily presented for an individual intake to address depression, which he stated has been getting worse. He reported having episodes of depression since he was about [age] years old. He also reported that he has had some issues with anger, but denied any problems as a result. Patient denied any history of mental health treatment. Patient identified several stresses including being laid off in . He is currently self-employed as a . Patient stated that there has been an increase in marital stress which he related to him not being steadily employed which has been resulting in financial stress. Reviewed limits of confidentiality and [facility] policies; patient expressed understanding and agreed to voluntarily proceed with evaluation and treatment.      Primary presenting issue includes   Chief Complaint   Patient presents with   • Depression   • Stress   • Sleep Problem   • Initial  Evaluation       FAMILY/SOCIAL HISTORY  Current living situation/household members: Patient currently lives with his wife of twenty-four years. Also living at home are two daughters.  Relevant family history/structure/dynamics: Both parents are . Father  in  of M.I. Mother  in  of heart disease. He has two brothers and one sister.   Current family/social stressors: Financial stress  Quality/quantity of current family and/or social support: Patient stated that he does not have much of a support system.  Does patient/parent report a family history of behavioral health issues,  diagnoses, or treatment? Yes  Family History   Problem Relation Age of Onset   • Bipolar disorder Mother    • Depression Mother    • Depression Sister    • Dementia Paternal Grandmother         BEHAVIORAL HEALTH TREATMENT HISTORY  Does patient/parent report a history of prior behavioral health treatment for patient? No:    History of untreated behavioral health issues identified? Yes    MEDICAL HISTORY   Past medical/surgical history:   Past Medical History:   Diagnosis Date   • Depression       Past Surgical History:   Procedure Laterality Date   • HERNIA REPAIR          Medication Allergies:  Patient has no known allergies.   Medical history provided by patient during current evaluation: Patient's medical history is well documented in this medical record.    Patient reports last physical exam: 2018  Does patient/parent report any history of or current developmental concerns? No  Does patient/parent report nutritional concerns? No  Does patient/parent report change in appetite or weight loss/gain? No  Does patient/parent report history of eating disorder symptoms? No  Does patient/parent report dental problem? No  Does patient/parent report physical pain? Yes   Indicate if pain is acute or chronic, and location: Arthritis   Pain scale rating:  varies     Does patient/parent report functional impact of medical, developmental, or pain issues?   N\A    EDUCATIONAL/LEARNING HISTORY  Is patient currently enrolled in a school/educational program? No:     Highest grade level completed: G.E.D. an A.A. in photography  School performance/functioning: average  History of Special Education/repeated grades/learning issues: no  Preferred learning style: auditory/visual  Current learning needs (large print, language barrier, etc):  None    EMPLOYMENT/RESOURCES  Is the patient currently employed? Yes, self-employed   Does the patient/parent report adequate financial resources? Yes  Does patient identify impact of  presenting issue on work functioning? Yes  Work or income-related stressors:  Financial stress     HISTORY:  Does patient report current or past enlistment? No    [If yes, complete below items]  Does patient report history of exposure to combat? No  Does patient report history of  sexual trauma? No  Does patient report other -related stressors? No    SPIRITUAL/CULTURAL/IDENTITY:  What are the patient’s/family’s spiritual beliefs or practices? Presybeterian  What is the patient’s cultural or ethnic background/identity?   How does the patient identify their sexual orientation? heterosexual  How does the patient identify their gender? male  Does the patient identify any spiritual/cultural/identity factors as relevant to the presenting issue? No    LEGAL HISTORY  Has the patient ever been involved with juvenile, adult, or family legal systems? No   [If yes, trigger section below:]  Does patient report ever being a victim of a crime?  No  Does patient report involvement in any current legal issues?  No  Does patient report ever being arrested or committing a crime? No  Does patient report any current agency (parole/probation/CPS/) involvement? No    ABUSE/NEGLECT/TRAUMA SCREENING  Does patient report feeling “unsafe” in his/her home, or afraid of anyone? No  Does patient report any history of physical, sexual, or emotional abuse? No  Does parent or significant other report any of the above? No  Is there evidence of neglect by self? No  Is there evidence of neglect by a caregiver? No  Does the patient/parent report any history of CPS/APS/police involvement related to suspected abuse/neglect or domestic violence? No  Does the patient/parent report any other history of potentially traumatic life events? No  Based on the information provided during the current assessment, is a mandated report of suspected abuse/neglect being made?  No     SAFETY ASSESSMENT - SELF  Does patient  acknowledge current or past symptoms of dangerousness to self? No  Does parent/significant other report patient has current or past symptoms of dangerousness to self? No      Recent change in frequency/specificity/intensity of suicidal thoughts or self-harm behavior? No  Current access to firearms, medications, or other identified means of suicide/self-harm? Yes  If yes, willing to restrict access to means of suicide/self-harm? not indicated  Protective factors present: Fear of suicide, Future-oriented, Good impulse control, Hopefulness, Moral objection to suicide, Optimism, Positive coping skills, Positive self-efficacy, Spiritual beliefs/practices and Strong socia/community connections    Current Suicide Risk: Low  Crisis Safety Plan completed and copy given to patient: No, but reviewed safety plan with patient.    SAFETY ASSESSMENT - OTHERS  Does paor past symptoms of aggressive behavior or risk to others? No  Does parent/significant othtient acknowledge current or past symptoms of aggressive behavior or risk to others? No  Does parent/significant other report patient has current or past symptoms of aggressive behavior or risk to others? No    Recent change in frequency/specificity/intensity of thoughts or threats to harm others? No  Current access to firearms/other identified means of harm? Yes  If yes, willing to restrict access to weapons/means of harm? not indicated  Protective factors present: Good frustration tolerance, Fear of consequences, Moral/spiritual prohibition, Guilt/remorse for past aggression, Well-developed sense of empathy, Positive impulse-control, Stable relationships and Low rumination/obsession    Current Homicide Risk:  Low  Crisis Safety Plan completed and copy given to patient? No  Based on information provided during the current assessment, is a mandated “duty to warn” being exercised? No    SUBSTANCE USE/ADDICTION HISTORY  [] Not applicable - patient 10 years of age or younger    Is  there a family history of substance use/addiction? No  Does patient acknowledge or parent/significant other report use of/dependence on substances? No  Last time patient used alcohol: denied  Within the past week? No  Last time patient used marijuana: denied  Within the past month? No  Any other street drugs ever tried even once? No  Any use of prescription medications/pills without a prescription, or for reasons others than originally prescribed?  No  Any other addictive behavior reported (gambling, shopping, sex)? No     Drug History:  Amphetamine:  Amphetamine frequency: Never used    Cannibis:  Cannabis frequency: Never used    Cocaine:  Cocaine frequency: Never used    Ecstasy:  Ecstasy frequency: Never used    Hallucinogen:  Hallucinogen frequency: Never used    Inhalant:   Inhalant frequency: Never used    Opiate:  Opiate frequency: Never used  Cannabis frequency: Never used    Other:  Other drug frequency: Never used    Sedative:   Sedative frequency: Never used      What consequences does the patient associate with any of the above substance use and or addictive behaviors? None    Patient’s motivation/readiness for change: N/A    [x] Patient denies use of any substance/addictive behaviors    STRENGTHS/ASSETS  Strengths Identified by interviewer: Insight into problems, Evidence of good judgement, Self-awareness, Social support, Stable relationships, Optimism, Problem-solving skills, Sense of humor and Social skills  Strengths Identified by patient: Motivated for treatment, spiritual    MENTAL STATUS/OBSERVATIONS   Participation: Active verbal participation, Attentive, Engaged and Open to feedback  Grooming: Casual and Neat  Orientation:Alert and Fully Oriented   Behavior: Calm  Eye contact: Good   Mood:Depressed  Affect:Flexible and Congruent with content  Thought process: Logical and Goal-directed  Thought content:  Within normal limits  Speech: Rate within normal limits and Volume within normal  limits  Perception: Within normal limits  Memory: No gross evidence of memory deficits  Insight: Good  Judgment:  Good  Other:    Family/couple interaction observations: N/A      CLINICAL FORMULATION:   Cody Wilson is a 53 year old, white male  The patient self-referred and voluntarily presented for an individual intake to address depression, which he stated has been getting worse. He reported having episodes of depression since he was about [age] years old. He also reported that he has had some issues with anger, but denied any problems as a result. Patient denied any history of mental health treatment. Patient identified several stresses including being laid off in 2014. He is currently self-employed as a . Patient stated that there has been an increase in marital stress which he related to him not being steadily employed which has been resulting in financial stress.    Patient did not present in acute distress. Patient was appropriately groomed and cooperative. Patient was alert and oriented to person, place, and time. Eye contact was appropriate. No abnormalities in attention or concentration were noted. No abnormalities of movement present; psychomotor activity was normal. Speech was fluent and regular in rhythm, rate, volume, and tone. Thought processes were linear, logical, and goal-directed. There was no evidence of thought disorder. No auditory or visual hallucinations. Long and short term memory appeared to be intact. Insight, judgment, and impulse control were deemed to be within normal limits. Reported mood was fairlynegative. Affect was was generally blunted with an obvious reduction in affective expression and congruent with thought content and conversation. Patient denied current suicidal and homicidal ideation or plan, intent, and preparatory behavior.      DIAGNOSTIC IMPRESSION(S):  1. Moderate episode of recurrent major depressive disorder (HCC)        IDENTIFIED NEEDS/PLAN:   [If any of these marked, trigger DISPOSITION list]  Mood/anxiety  Actively being addressed by [facility]     PLAN/DISPOSITION:    1) The patient will return to the clinic 2 weeks.  2) Crisis Response Plan:  Reviewed emergency resources with the patient and the patient expressed understanding including:  If feeling suicidal, patient will call or present to the Behavioral Health Clinic during duty hours or present to closest ED ([facility] or [facility], call 911 or crisis hotline (7-368-525-XAIT) after duty hours.  3) Referrals/Consults:  N/A  4) Barriers to Learning:  No  5) Readiness to Learn:  Yes  6) Cultural Concerns:  No  7) Patient voiced understanding of, and agreement with, plan and goals as annotated above.  8) Declare these services are medically necessary and appropriate to the patient’s diagnosis and needs  9) The point of contact at the [facility] regarding this evaluation is Dr. Wadsworth, Psychologist.      Does patient express agreement with the above plan? Yes     Referral appointment(s) scheduled? No       Demario Wadsworth, Ph.D.

## 2019-12-23 ENCOUNTER — APPOINTMENT (OUTPATIENT)
Dept: BEHAVIORAL HEALTH | Facility: CLINIC | Age: 54
End: 2019-12-23
Payer: COMMERCIAL

## 2020-04-30 ENCOUNTER — HOSPITAL ENCOUNTER (OUTPATIENT)
Dept: LAB | Facility: MEDICAL CENTER | Age: 55
End: 2020-04-30
Attending: FAMILY MEDICINE
Payer: COMMERCIAL

## 2020-04-30 LAB
ALBUMIN SERPL BCP-MCNC: 4.1 G/DL (ref 3.2–4.9)
ALBUMIN/GLOB SERPL: 1.5 G/DL
ALP SERPL-CCNC: 89 U/L (ref 30–99)
ALT SERPL-CCNC: 18 U/L (ref 2–50)
ANION GAP SERPL CALC-SCNC: 11 MMOL/L (ref 7–16)
AST SERPL-CCNC: 26 U/L (ref 12–45)
BILIRUB SERPL-MCNC: 0.7 MG/DL (ref 0.1–1.5)
BUN SERPL-MCNC: 17 MG/DL (ref 8–22)
CALCIUM SERPL-MCNC: 9.1 MG/DL (ref 8.5–10.5)
CHLORIDE SERPL-SCNC: 106 MMOL/L (ref 96–112)
CHOLEST SERPL-MCNC: 157 MG/DL (ref 100–199)
CO2 SERPL-SCNC: 25 MMOL/L (ref 20–33)
CREAT SERPL-MCNC: 0.92 MG/DL (ref 0.5–1.4)
EST. AVERAGE GLUCOSE BLD GHB EST-MCNC: 111 MG/DL
FASTING STATUS PATIENT QL REPORTED: NORMAL
GLOBULIN SER CALC-MCNC: 2.7 G/DL (ref 1.9–3.5)
GLUCOSE SERPL-MCNC: 99 MG/DL (ref 65–99)
HBA1C MFR BLD: 5.5 % (ref 0–5.6)
HDLC SERPL-MCNC: 44 MG/DL
LDLC SERPL CALC-MCNC: 97 MG/DL
POTASSIUM SERPL-SCNC: 4 MMOL/L (ref 3.6–5.5)
PROT SERPL-MCNC: 6.8 G/DL (ref 6–8.2)
SODIUM SERPL-SCNC: 142 MMOL/L (ref 135–145)
TRIGL SERPL-MCNC: 78 MG/DL (ref 0–149)

## 2020-04-30 PROCEDURE — 83036 HEMOGLOBIN GLYCOSYLATED A1C: CPT

## 2020-04-30 PROCEDURE — 36415 COLL VENOUS BLD VENIPUNCTURE: CPT

## 2020-04-30 PROCEDURE — 80061 LIPID PANEL: CPT

## 2020-04-30 PROCEDURE — 80053 COMPREHEN METABOLIC PANEL: CPT

## 2020-08-10 ENCOUNTER — APPOINTMENT (OUTPATIENT)
Dept: ADMISSIONS | Facility: MEDICAL CENTER | Age: 55
End: 2020-08-10
Payer: COMMERCIAL

## 2020-08-10 DIAGNOSIS — Z01.812 PRE-OPERATIVE LABORATORY EXAMINATION: ICD-10-CM

## 2020-08-10 LAB
COVID ORDER STATUS COVID19: NORMAL
SARS-COV-2 RNA RESP QL NAA+PROBE: NOTDETECTED
SPECIMEN SOURCE: NORMAL

## 2020-08-10 PROCEDURE — U0003 INFECTIOUS AGENT DETECTION BY NUCLEIC ACID (DNA OR RNA); SEVERE ACUTE RESPIRATORY SYNDROME CORONAVIRUS 2 (SARS-COV-2) (CORONAVIRUS DISEASE [COVID-19]), AMPLIFIED PROBE TECHNIQUE, MAKING USE OF HIGH THROUGHPUT TECHNOLOGIES AS DESCRIBED BY CMS-2020-01-R: HCPCS

## 2020-08-10 RX ORDER — FLUTICASONE PROPIONATE 50 MCG
2 SPRAY, SUSPENSION (ML) NASAL DAILY
COMMUNITY
Start: 2020-05-26 | End: 2020-08-10

## 2020-08-10 RX ORDER — PANTOPRAZOLE SODIUM 40 MG/1
40 TABLET, DELAYED RELEASE ORAL DAILY
COMMUNITY
Start: 2020-06-22 | End: 2020-08-10

## 2020-08-10 RX ORDER — IBUPROFEN 200 MG
400 TABLET ORAL EVERY 6 HOURS PRN
COMMUNITY
End: 2023-03-04

## 2020-08-10 SDOH — HEALTH STABILITY: MENTAL HEALTH: HOW OFTEN DO YOU HAVE A DRINK CONTAINING ALCOHOL?: NEVER

## 2020-08-10 SDOH — HEALTH STABILITY: MENTAL HEALTH: HOW OFTEN DO YOU HAVE 6 OR MORE DRINKS ON ONE OCCASION?: NEVER

## 2020-08-10 NOTE — OR NURSING
"Pre-admit appointment completed. \"Preparing for your procedure\" sheet given to pt along with verbal and written instructions.     Pt denies any problems with anesthesia.    Pt to get COVID test today.           Pt given instructions to self isolate after COVID test and to contact doctor if any symptoms of COVID occur.  "

## 2020-08-12 ENCOUNTER — ANESTHESIA EVENT (OUTPATIENT)
Dept: SURGERY | Facility: MEDICAL CENTER | Age: 55
End: 2020-08-12
Payer: COMMERCIAL

## 2020-08-13 ENCOUNTER — ANESTHESIA (OUTPATIENT)
Dept: SURGERY | Facility: MEDICAL CENTER | Age: 55
End: 2020-08-13
Payer: COMMERCIAL

## 2020-08-13 ENCOUNTER — HOSPITAL ENCOUNTER (OUTPATIENT)
Facility: MEDICAL CENTER | Age: 55
End: 2020-08-13
Attending: INTERNAL MEDICINE | Admitting: INTERNAL MEDICINE
Payer: COMMERCIAL

## 2020-08-13 VITALS
RESPIRATION RATE: 16 BRPM | OXYGEN SATURATION: 94 % | HEART RATE: 50 BPM | DIASTOLIC BLOOD PRESSURE: 78 MMHG | BODY MASS INDEX: 29.45 KG/M2 | WEIGHT: 229.5 LBS | SYSTOLIC BLOOD PRESSURE: 114 MMHG | TEMPERATURE: 97 F | HEIGHT: 74 IN

## 2020-08-13 PROBLEM — K21.9 GERD (GASTROESOPHAGEAL REFLUX DISEASE): Status: ACTIVE | Noted: 2020-08-13

## 2020-08-13 LAB — PATHOLOGY CONSULT NOTE: NORMAL

## 2020-08-13 PROCEDURE — 88342 IMHCHEM/IMCYTCHM 1ST ANTB: CPT

## 2020-08-13 PROCEDURE — 160009 HCHG ANES TIME/MIN: Performed by: INTERNAL MEDICINE

## 2020-08-13 PROCEDURE — 160203 HCHG ENDO MINUTES - 1ST 30 MINS LEVEL 4: Performed by: INTERNAL MEDICINE

## 2020-08-13 PROCEDURE — 160002 HCHG RECOVERY MINUTES (STAT): Performed by: INTERNAL MEDICINE

## 2020-08-13 PROCEDURE — 160025 RECOVERY II MINUTES (STATS): Performed by: INTERNAL MEDICINE

## 2020-08-13 PROCEDURE — 501629 HCHG TUBE, LUKI TRAP STERILE DISP: Performed by: INTERNAL MEDICINE

## 2020-08-13 PROCEDURE — 160048 HCHG OR STATISTICAL LEVEL 1-5: Performed by: INTERNAL MEDICINE

## 2020-08-13 PROCEDURE — 160035 HCHG PACU - 1ST 60 MINS PHASE I: Performed by: INTERNAL MEDICINE

## 2020-08-13 PROCEDURE — 500066 HCHG BITE BLOCK, ECT: Performed by: INTERNAL MEDICINE

## 2020-08-13 PROCEDURE — 700101 HCHG RX REV CODE 250

## 2020-08-13 PROCEDURE — 160046 HCHG PACU - 1ST 60 MINS PHASE II: Performed by: INTERNAL MEDICINE

## 2020-08-13 PROCEDURE — 502240 HCHG MISC OR SUPPLY RC 0272: Performed by: INTERNAL MEDICINE

## 2020-08-13 PROCEDURE — 700111 HCHG RX REV CODE 636 W/ 250 OVERRIDE (IP): Performed by: ANESTHESIOLOGY

## 2020-08-13 PROCEDURE — 160208 HCHG ENDO MINUTES - EA ADDL 1 MIN LEVEL 4: Performed by: INTERNAL MEDICINE

## 2020-08-13 PROCEDURE — 88341 IMHCHEM/IMCYTCHM EA ADD ANTB: CPT | Mod: 91

## 2020-08-13 PROCEDURE — 700105 HCHG RX REV CODE 258: Performed by: INTERNAL MEDICINE

## 2020-08-13 PROCEDURE — 88305 TISSUE EXAM BY PATHOLOGIST: CPT

## 2020-08-13 PROCEDURE — 700101 HCHG RX REV CODE 250: Performed by: ANESTHESIOLOGY

## 2020-08-13 RX ORDER — HYDROMORPHONE HYDROCHLORIDE 1 MG/ML
0.2 INJECTION, SOLUTION INTRAMUSCULAR; INTRAVENOUS; SUBCUTANEOUS
Status: DISCONTINUED | OUTPATIENT
Start: 2020-08-13 | End: 2020-08-13 | Stop reason: HOSPADM

## 2020-08-13 RX ORDER — HYDRALAZINE HYDROCHLORIDE 20 MG/ML
5 INJECTION INTRAMUSCULAR; INTRAVENOUS
Status: DISCONTINUED | OUTPATIENT
Start: 2020-08-13 | End: 2020-08-13 | Stop reason: HOSPADM

## 2020-08-13 RX ORDER — LABETALOL HYDROCHLORIDE 5 MG/ML
5 INJECTION, SOLUTION INTRAVENOUS
Status: DISCONTINUED | OUTPATIENT
Start: 2020-08-13 | End: 2020-08-13 | Stop reason: HOSPADM

## 2020-08-13 RX ORDER — LIDOCAINE HYDROCHLORIDE 20 MG/ML
INJECTION, SOLUTION EPIDURAL; INFILTRATION; INTRACAUDAL; PERINEURAL PRN
Status: DISCONTINUED | OUTPATIENT
Start: 2020-08-13 | End: 2020-08-13 | Stop reason: SURG

## 2020-08-13 RX ORDER — OXYCODONE HCL 5 MG/5 ML
10 SOLUTION, ORAL ORAL
Status: DISCONTINUED | OUTPATIENT
Start: 2020-08-13 | End: 2020-08-13 | Stop reason: HOSPADM

## 2020-08-13 RX ORDER — SODIUM CHLORIDE, SODIUM LACTATE, POTASSIUM CHLORIDE, CALCIUM CHLORIDE 600; 310; 30; 20 MG/100ML; MG/100ML; MG/100ML; MG/100ML
INJECTION, SOLUTION INTRAVENOUS CONTINUOUS
Status: DISCONTINUED | OUTPATIENT
Start: 2020-08-13 | End: 2020-08-13 | Stop reason: HOSPADM

## 2020-08-13 RX ORDER — OXYCODONE HCL 5 MG/5 ML
5 SOLUTION, ORAL ORAL
Status: DISCONTINUED | OUTPATIENT
Start: 2020-08-13 | End: 2020-08-13 | Stop reason: HOSPADM

## 2020-08-13 RX ORDER — HALOPERIDOL 5 MG/ML
1 INJECTION INTRAMUSCULAR
Status: DISCONTINUED | OUTPATIENT
Start: 2020-08-13 | End: 2020-08-13 | Stop reason: HOSPADM

## 2020-08-13 RX ORDER — ROCURONIUM BROMIDE 10 MG/ML
INJECTION, SOLUTION INTRAVENOUS PRN
Status: DISCONTINUED | OUTPATIENT
Start: 2020-08-13 | End: 2020-08-13 | Stop reason: SURG

## 2020-08-13 RX ORDER — DIPHENHYDRAMINE HYDROCHLORIDE 50 MG/ML
12.5 INJECTION INTRAMUSCULAR; INTRAVENOUS
Status: DISCONTINUED | OUTPATIENT
Start: 2020-08-13 | End: 2020-08-13 | Stop reason: HOSPADM

## 2020-08-13 RX ORDER — HYDROMORPHONE HYDROCHLORIDE 1 MG/ML
0.4 INJECTION, SOLUTION INTRAMUSCULAR; INTRAVENOUS; SUBCUTANEOUS
Status: DISCONTINUED | OUTPATIENT
Start: 2020-08-13 | End: 2020-08-13 | Stop reason: HOSPADM

## 2020-08-13 RX ORDER — DEXAMETHASONE SODIUM PHOSPHATE 4 MG/ML
INJECTION, SOLUTION INTRA-ARTICULAR; INTRALESIONAL; INTRAMUSCULAR; INTRAVENOUS; SOFT TISSUE PRN
Status: DISCONTINUED | OUTPATIENT
Start: 2020-08-13 | End: 2020-08-13 | Stop reason: SURG

## 2020-08-13 RX ORDER — MEPERIDINE HYDROCHLORIDE 25 MG/ML
12.5 INJECTION INTRAMUSCULAR; INTRAVENOUS; SUBCUTANEOUS
Status: DISCONTINUED | OUTPATIENT
Start: 2020-08-13 | End: 2020-08-13 | Stop reason: HOSPADM

## 2020-08-13 RX ORDER — LIDOCAINE HYDROCHLORIDE 10 MG/ML
INJECTION, SOLUTION INFILTRATION; PERINEURAL
Status: COMPLETED
Start: 2020-08-13 | End: 2020-08-13

## 2020-08-13 RX ORDER — ONDANSETRON 2 MG/ML
INJECTION INTRAMUSCULAR; INTRAVENOUS PRN
Status: DISCONTINUED | OUTPATIENT
Start: 2020-08-13 | End: 2020-08-13 | Stop reason: SURG

## 2020-08-13 RX ORDER — ONDANSETRON 2 MG/ML
4 INJECTION INTRAMUSCULAR; INTRAVENOUS
Status: DISCONTINUED | OUTPATIENT
Start: 2020-08-13 | End: 2020-08-13 | Stop reason: HOSPADM

## 2020-08-13 RX ORDER — HYDROMORPHONE HYDROCHLORIDE 1 MG/ML
0.1 INJECTION, SOLUTION INTRAMUSCULAR; INTRAVENOUS; SUBCUTANEOUS
Status: DISCONTINUED | OUTPATIENT
Start: 2020-08-13 | End: 2020-08-13 | Stop reason: HOSPADM

## 2020-08-13 RX ADMIN — ONDANSETRON 4 MG: 2 INJECTION INTRAMUSCULAR; INTRAVENOUS at 11:08

## 2020-08-13 RX ADMIN — LIDOCAINE HYDROCHLORIDE 60 MG: 20 INJECTION, SOLUTION EPIDURAL; INFILTRATION; INTRACAUDAL; PERINEURAL at 10:28

## 2020-08-13 RX ADMIN — ROCURONIUM BROMIDE 50 MG: 10 INJECTION, SOLUTION INTRAVENOUS at 10:28

## 2020-08-13 RX ADMIN — SODIUM CHLORIDE, POTASSIUM CHLORIDE, SODIUM LACTATE AND CALCIUM CHLORIDE: 600; 310; 30; 20 INJECTION, SOLUTION INTRAVENOUS at 08:42

## 2020-08-13 RX ADMIN — PROPOFOL 200 MG: 10 INJECTION, EMULSION INTRAVENOUS at 10:28

## 2020-08-13 RX ADMIN — WATER 15 ML: 100 IRRIGANT IRRIGATION at 08:18

## 2020-08-13 RX ADMIN — SUGAMMADEX 200 MG: 100 INJECTION, SOLUTION INTRAVENOUS at 11:06

## 2020-08-13 RX ADMIN — Medication 0.5 ML: at 08:41

## 2020-08-13 RX ADMIN — LIDOCAINE HYDROCHLORIDE 0.5 ML: 10 INJECTION, SOLUTION INFILTRATION; PERINEURAL at 08:41

## 2020-08-13 RX ADMIN — FENTANYL CITRATE 50 MCG: 50 INJECTION, SOLUTION INTRAMUSCULAR; INTRAVENOUS at 10:26

## 2020-08-13 RX ADMIN — FENTANYL CITRATE 50 MCG: 50 INJECTION, SOLUTION INTRAMUSCULAR; INTRAVENOUS at 11:08

## 2020-08-13 RX ADMIN — DEXAMETHASONE SODIUM PHOSPHATE 8 MG: 4 INJECTION, SOLUTION INTRAMUSCULAR; INTRAVENOUS at 10:33

## 2020-08-13 ASSESSMENT — PAIN SCALES - GENERAL: PAIN_LEVEL: 0

## 2020-08-13 NOTE — ANESTHESIA QCDR
2019 [facility] Clinical Data Registry (for Quality Improvement)     Postoperative nausea/vomiting risk protocol (Adult = 18 yrs and Pediatric 3-17 yrs)- (430 and 463)  General inhalation anesthetic (NOT TIVA) with PONV risk factors: No  Provision of anti-emetic therapy with at least 2 different classes of agents: N/A  Patient DID NOT receive anti-emetic therapy and reason is documented in Medical Record: N/A    Multimodal Pain Management- (477)  Non-emergent surgery AND patient age >= 18: No  Use of Multimodal Pain Management, two or more drugs and/or interventions, NOT including systemic opioids:   Exception: Documented allergy to multiple classes of analgesics:     Smoking Abstinence (404)  Patient is current smoker (cigarette, pipe, e-cig, marijuanna): No  Elective Surgery:   Abstinence instructions provided prior to day of surgery:   Patient abstained from smoking on day of surgery:     Pre-Op Beta-Blocker in Isolated CABG (44)  Isolated CABG AND patient age >= 18: No  Beta-blocker admin within 24 hours of surgical incision:   Exception:of medical reason(s) for not administering beta blocker within 24 hours prior to surgical incision (e.g., not  indicated,other medical reason):     PACU assessment of acute postoperative pain prior to Anesthesia Care End- Applies to Patients Age = 18- (ABG7)  Initial PACU pain score is which of the following: < 7/10  Patient unable to report pain score: N/A    Post-anesthetic transfer of care checklist/protocol to PACU/ICU- (426 and 427)  Upon conclusion of case, patient transferred to which of the following locations: PACU/Non-ICU  Use of transfer checklist/protocol: Yes  Exclusion: Service Performed in Patient Hospital Room (and thus did not require transfer): N/A  Unplanned admission to ICU related to anesthesia service up through end of PACU care- (MD51)  Unplanned admission to ICU (not initially anticipated at anesthesia start time): No

## 2020-08-13 NOTE — OR NURSING
1201 Pt states pain is controled and tolerable, denies nausea. Pt tolerating PO fluids. Pt to phase II

## 2020-08-13 NOTE — ANESTHESIA PREPROCEDURE EVALUATION
Relevant Problems   GI   (+) GERD (gastroesophageal reflux disease)       Physical Exam    Airway   Mallampati: II  TM distance: >3 FB  Neck ROM: full       Cardiovascular - normal exam  Rhythm: regular  Rate: normal  (-) murmur     Dental - normal exam           Pulmonary - normal exam  Breath sounds clear to auscultation     Abdominal    Neurological - normal exam                 Anesthesia Plan    ASA 2       Plan - general       Airway plan will be ETT        Induction: intravenous    Postoperative Plan: Postoperative administration of opioids is intended.    Pertinent diagnostic labs and testing reviewed    Informed Consent:    Anesthetic plan and risks discussed with patient.    Use of blood products discussed with: patient whom consented to blood products.

## 2020-08-13 NOTE — OR NURSING
1118 Pt arrived from OR post   GASTROSCOPY - WITH POSSIBLE BIOPSIES     EGD, WITH ENDOSCOPIC US     EGD, WITH ASPIRATION BIOPSY - POSSIBLE(((Not done))))     , report received. Pt breathing unlabored with clear lung sounds bilat.

## 2020-08-13 NOTE — ANESTHESIA TIME REPORT
Anesthesia Start and Stop Event Times     Date Time Event    8/13/2020 1005 Ready for Procedure     1024 Anesthesia Start     1120 Anesthesia Stop        Responsible Staff  08/13/20    Name Role Begin End    Barbra Keller M.D. 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

## 2020-08-13 NOTE — OR NURSING
1209: To stage ll . No pain or nausea.  1214: Dr. Coronado at chairside talking w/ pt. Awaiting wife.  1239: Home care instructions reviewed w/ pt and wife. No questions, meets criteria for discharge.

## 2020-08-13 NOTE — DISCHARGE INSTRUCTIONS
ENDOSCOPY HOME CARE INSTRUCTIONS    GASTROSCOPY OR ERCP  1. Don't eat or drink anything for about an hour after the test. You can then resume your regular diet.  2. Don't drive or drink alcohol for 24 hours. The medication you received will make you too drowsy.  3. Don't take any coffee, tea, or aspirin products until after you see your doctor. These can harm the lining of your stomach.  4. If you begin to vomit bloody material, or develop black or bloody stools, call your doctor as soon as possible.  5. If you have any neck, chest, abdominal pain or temp of 100 degrees, call your doctor.  6. See your doctor as previously scheduled       IMPRESSION:  1) 6.1mm submucosal esophageal mass that appears benign removed successfully today using an EMR technique      RECOMMENDATION:    1) f/u pathology  2) recall EGD pending biopsy results  3) discharge home today  4) Patient should use OTC H2 blocker or PPI for the next 5 days  5) f/u with Dr Luz Maria Coronado [phone]    You should call 911 if you develop problems with breathing or chest pain.  If any questions arise, call your doctor. If your doctor is not available, please feel free to call [phone].     You can also call the HEALTH HOTLINE open 24 hours/day, 7 days/week and speak to a nurse at [phone], or toll free [phone].    Depression / Suicide Risk    As you are discharged from this [facility] facility, it is important to learn how to keep safe from harming yourself.    Recognize the warning signs:  · Abrupt changes in personality, positive or negative- including increase in energy   · Giving away possessions  · Change in eating patterns- significant weight changes-  positive or negative  · Change in sleeping patterns- unable to sleep or sleeping all the time   · Unwillingness or inability to communicate  · Depression  · Unusual sadness, discouragement and loneliness  · Talk of wanting to die  · Neglect of personal  appearance   · Rebelliousness- reckless behavior  · Withdrawal from people/activities they love  · Confusion- inability to concentrate     If you or a loved one observes any of these behaviors or has concerns about self-harm, here's what you can do:  · Talk about it- your feelings and reasons for harming yourself  · Remove any means that you might use to hurt yourself (examples: pills, rope, extension cords, firearm)  · Get professional help from the community (Mental Health, Substance Abuse, psychological counseling)  · Do not be alone:Call your Safe Contact- someone whom you trust who will be there for you.  · Call your local CRISIS HOTLINE 183-5493 or 793-332-8913  · Call your local Children's Mobile Crisis Response Team [place] (065) 098-1022 or www.[employer]  · Call the toll free National Suicide Prevention Hotlines   · National Suicide Prevention Lifeline 142-774-HRNU (5980)  · National Hope Line Network 800-SUICIDE (395-4755)    I acknowledge receipt and understanding of these Home Care Instructions.

## 2020-08-13 NOTE — ANESTHESIA POSTPROCEDURE EVALUATION
Patient: Cody Wilson    Procedure Summary     Date: 08/13/20 Room / Location:  ENDOSCOPIC ULTRASOUND ROOM / SURGERY [facility]    Anesthesia Start: 1024 Anesthesia Stop: 1120    Procedures:       GASTROSCOPY - WITH POSSIBLE BIOPSIES (Abdomen)      EGD, WITH ENDOSCOPIC US      EGD, WITH ASPIRATION BIOPSY - POSSIBLE(((Not done)))) Diagnosis: (SUBMUCOSAL NODULE IN ESOPHAGUS, ABNORMAL FINDINGS ON EGD;)    Surgeon: Man Coronado M.D. Responsible Provider: Barbra Keller M.D.    Anesthesia Type: general ASA Status: 2          Final Anesthesia Type: general  Last vitals  BP   Blood Pressure: 117/71    Temp   36.3 °C (97.3 °F)    Pulse   Pulse: 65   Resp   18    SpO2   100 %      Anesthesia Post Evaluation    Patient location during evaluation: PACU  Patient participation: complete - patient participated  Level of consciousness: awake and alert  Pain score: 0    Airway patency: patent  Anesthetic complications: no  Cardiovascular status: hemodynamically stable  Respiratory status: acceptable  Hydration status: euvolemic    PONV: none           Nurse Pain Score: 0 (NPRS)

## 2020-08-13 NOTE — OR NURSING
1130: Assumed care from RADHA Bradford. Patient is resting and has no pain or nausea.    1145: Patient is pain and nausea free. Family updated by phone.

## 2020-08-13 NOTE — OP REPORT
DATE OF SERVICE:  08/13/2020     INDICATION FOR PROCEDURE:  benign esophageal submucosal mass    PROCEDURE PERFORMED: EGD/EUS with endoscopic mucosal resection     CONSENT:  Informed consent was obtained directly from the patient after benefits, risks and possible alternatives were discussed.     MEDICATIONS:  The patient received general anesthesia for this procedure. Please see the anesthesia record for details     PROCEDURE DESCRIPTION:  The patient was placed in the left lateral decubitus position after sedation and intubation by the anesthesiologist. Vital signs were monitored continuously throughout the procedure.  When ready, the upper endoscope was placed in the patient's mouth and advanced easily and carefully to the esophagus and subsequently to the stomach and duodenum. The scope was then slowly withdrawn and mucosa examined. Retroflexion was performed in the stomach. The EGD scope was removed.    The EUS scope was then selected. The scope was placed in the mouth and advanced with semi-direct visualization through the oropharynx to the esophagus, stomach and duodenum. Ultrasound images are noted below. The patients toleration of this procedure was excellent.     FINDINGS:    Esophagus: at the distal esophagus approximately 41cm from in the incisors, there was a 1cm submucosal mass. This was round and firm.     Stomach: normal    Duodenum: normal    EUS: The radial scope was selected. The gastric wall appeared normal. The liver appeared normal. There was no intrahepatic ductal dilation. The aorta, celiac artery and SMA appeared normal. The left kidney, left adrenal and spleen appeared normal. The pancreas appeared normal. The PD was normal caliber. The scope was advanced to the duodenal bulb. The duodenal wall appeared normal. The CBD measured 5.8mm and was non-dilated. The pancreatic head was evaluated and appeared normal. The scope was advanced to D2 and the uncinate appeared normal. The scope was  withdrawn to the esophagus. The esophageal wall was was interrogated at 10MHz. There was an oval hypoechoic mass measuring 6.1 x 4.0mm. This appeared to arise just below the mucosa but above the muscularis mucosa. The nature of this lesion is unknown but appears benign.    INTERVENTION: After completion of the EUS, it was felt that the lesion was amenable to removal. The EGD scope was then used to perform endoscopic mucosal resection. Approximately 2cc of Orise was injected below the nodule to raise it away from the muscularis propria. Then using the [employer] cap-assisted EMR kit, the lesion was removed in its entirety. The mucosal defect was then closed using a single endoclip. Next, the lesion was retrieved using a mora net and the procedure was completed.        COMPLICATIONS:  No complications or blood loss during or in the immediate postoperative period.     IMPRESSION:  1) 6.1mm submucosal esophageal mass that appears benign removed successfully today using an EMR technique      RECOMMENDATION:    1) f/u pathology  2) recall EGD pending biopsy results  3) discharge home today  4) Patient should use OTC H2 blocker or PPI for the next 5 days  5) f/u with Dr Loera

## 2020-08-13 NOTE — OR NURSING
0842 Patient allergies and NPO status verified, home medication reconciliation completed and belongings secured. Surgical site verified with patient. Patient verbalizes understanding of pain scale, expected course of stay and plan of care; patient and family state verbal understanding at this time. IV access established. B knees elevated for comfort due to hx of LBP.

## 2020-08-13 NOTE — ANESTHESIA PROCEDURE NOTES
Airway    Date/Time: 8/13/2020 10:30 AM  Performed by: Barbra Keller M.D.  Authorized by: Barbra Keller M.D.     Location:  OR  Urgency:  Elective  Difficult Airway: No    Indications for Airway Management:  Anesthesia      Spontaneous Ventilation: absent    Sedation Level:  Deep  Preoxygenated: Yes    Patient Position:  Sniffing  Mask Difficulty Assessment:  1 - vent by mask  Final Airway Type:  Endotracheal airway  Final Endotracheal Airway:  ETT  Cuffed: Yes    Technique Used for Successful ETT Placement:  Direct laryngoscopy    Insertion Site:  Oral  Blade Type:  Mimi  Laryngoscope Blade/Videolaryngoscope Blade Size:  4  ETT Size (mm):  7.5  Measured from:  Teeth  ETT to Teeth (cm):  23  Placement Verified by: auscultation and capnometry    Cormack-Lehane Classification:  Grade I - full view of glottis  Number of Attempts at Approach:  1

## 2021-03-15 DIAGNOSIS — Z23 NEED FOR VACCINATION: ICD-10-CM

## 2022-04-13 ENCOUNTER — OFFICE VISIT (OUTPATIENT)
Dept: URGENT CARE | Facility: PHYSICIAN GROUP | Age: 57
End: 2022-04-13
Payer: COMMERCIAL

## 2022-04-13 VITALS
TEMPERATURE: 98.7 F | OXYGEN SATURATION: 96 % | RESPIRATION RATE: 18 BRPM | HEIGHT: 74 IN | DIASTOLIC BLOOD PRESSURE: 74 MMHG | SYSTOLIC BLOOD PRESSURE: 136 MMHG | HEART RATE: 80 BPM | BODY MASS INDEX: 29.52 KG/M2 | WEIGHT: 230 LBS

## 2022-04-13 DIAGNOSIS — J01.00 ACUTE MAXILLARY SINUSITIS, RECURRENCE NOT SPECIFIED: ICD-10-CM

## 2022-04-13 PROCEDURE — 99204 OFFICE O/P NEW MOD 45 MIN: CPT | Performed by: FAMILY MEDICINE

## 2022-04-13 RX ORDER — CICLESONIDE 50 UG/1
1 SPRAY NASAL DAILY
Qty: 1 ACT | Refills: 0 | Status: SHIPPED | OUTPATIENT
Start: 2022-04-13 | End: 2023-03-04

## 2022-04-13 RX ORDER — BENZONATATE 200 MG/1
200 CAPSULE ORAL 3 TIMES DAILY PRN
Qty: 45 CAPSULE | Refills: 0 | Status: SHIPPED | OUTPATIENT
Start: 2022-04-13 | End: 2023-03-04

## 2022-04-13 RX ORDER — AMOXICILLIN 875 MG/1
875 TABLET, COATED ORAL 2 TIMES DAILY
Qty: 14 TABLET | Refills: 0 | Status: SHIPPED | OUTPATIENT
Start: 2022-04-13 | End: 2022-04-20

## 2022-04-13 NOTE — PROGRESS NOTES
"Chief Complaint   Patient presents with   • Cough     X 7 days with congestion         Cough  This is a new problem. The current episode started 7 days ago. The problem has been unchanged. The problem occurs constantly. The cough is productive.   + subj fever.  + sinus pain.   . Associated symptoms include : fatigue    Pertinent negatives include no    , nausea, vomiting, diarrhea, sweats, weight loss or wheezing. Nothing aggravates the symptoms.  Patient has tried nothing for the symptoms. There is no history of asthma.        Past Medical History:   Diagnosis Date   • Arthritis     Feet, knees, hips, back   • Depression     No meds   • GERD (gastroesophageal reflux disease)     was treated with pantoprazole         Social History     Tobacco Use   • Smoking status: Never Smoker   • Smokeless tobacco: Never Used   Vaping Use   • Vaping Use: Never used   Substance Use Topics   • Alcohol use: Never   • Drug use: Never         Current Outpatient Medications on File Prior to Visit   Medication Sig Dispense Refill   • ibuprofen (MOTRIN) 200 MG Tab Take 400 mg by mouth every 6 hours as needed. (Patient not taking: Reported on 4/13/2022)       No current facility-administered medications on file prior to visit.                    Review of Systems   Constitutional: + for fever     HENT: negative for otalgia  Cardiovascular - denies chest pain or dyspnea  Respiratory: Positive for cough.  .  Negative for wheezing.    Neurological: + for headaches.   GI - denies nausea, vomiting or diarrhea  Neuro - denies numbness or tingling.            Objective:     /74 (BP Location: Left arm, Patient Position: Sitting, BP Cuff Size: Adult)   Pulse 80   Temp 37.1 °C (98.7 °F) (Temporal)   Resp 18   Ht 1.88 m (6' 2\")   Wt 104 kg (230 lb)   SpO2 96%     Physical Exam   Constitutional: patient is oriented to person, place, and time. Patient appears well-developed and well-nourished. No distress.   HENT:   Head: Normocephalic " and atraumatic.   Right Ear: External ear normal.   Left Ear: External ear normal.   Nose: Mucosal edema  present. + bilat sinus TTP  Mouth/Throat: Mucous membranes are normal. No oral lesions.  No posterior pharyngeal erythema.  No oropharyngeal exudate or posterior oropharyngeal edema.   Eyes: Conjunctivae and EOM are normal. Pupils are equal, round, and reactive to light. Right eye exhibits no discharge. Left eye exhibits no discharge. No scleral icterus.   Neck: Normal range of motion. Neck supple. No tracheal deviation present.   Cardiovascular: Normal rate, regular rhythm and normal heart sounds.  Exam reveals no friction rub.    Pulmonary/Chest: Effort normal. No respiratory distress. Patient has no wheezes or rhonchi. Patient has no rales.    Musculoskeletal:  exhibits no edema.   Lymphadenopathy:     Patient has no cervical adenopathy.      Neurological: patient is alert and oriented to person, place, and time.   Skin: Skin is warm and dry. No rash noted. No erythema.   Psychiatric: patient  has a normal mood and affect.  behavior is normal.   Nursing note and vitals reviewed.              Assessment/Plan:        1. Acute maxillary sinusitis, recurrence not specified  Pt presents with sinus congestion  and systemic symptoms (fever)       - amoxicillin (AMOXIL) 875 MG tablet; Take 1 Tablet by mouth 2 times a day for 7 days.  Dispense: 14 Tablet; Refill: 0  - Ciclesonide (OMNARIS) 50 MCG/ACT Suspension; Administer 1 Act into affected nostril(S) every day.  Dispense: 1 Act; Refill: 0  - benzonatate (TESSALON) 200 MG capsule; Take 1 Capsule by mouth 3 times a day as needed for Cough.  Dispense: 45 Capsule; Refill: 0         Follow up in one week if no improvement, sooner if symptoms worsen.

## 2022-08-11 ENCOUNTER — HOSPITAL ENCOUNTER (OUTPATIENT)
Dept: RADIOLOGY | Facility: MEDICAL CENTER | Age: 57
End: 2022-08-11
Attending: FAMILY MEDICINE
Payer: COMMERCIAL

## 2022-08-11 DIAGNOSIS — M54.6 PAIN IN THORACIC SPINE: ICD-10-CM

## 2022-08-11 DIAGNOSIS — M54.50 LOW BACK PAIN, UNSPECIFIED BACK PAIN LATERALITY, UNSPECIFIED CHRONICITY, UNSPECIFIED WHETHER SCIATICA PRESENT: ICD-10-CM

## 2022-08-11 PROCEDURE — 72070 X-RAY EXAM THORAC SPINE 2VWS: CPT

## 2022-10-14 ENCOUNTER — OFFICE VISIT (OUTPATIENT)
Dept: URGENT CARE | Facility: PHYSICIAN GROUP | Age: 57
End: 2022-10-14
Payer: COMMERCIAL

## 2022-10-14 ENCOUNTER — HOSPITAL ENCOUNTER (OUTPATIENT)
Dept: RADIOLOGY | Facility: MEDICAL CENTER | Age: 57
End: 2022-10-14
Attending: PHYSICIAN ASSISTANT
Payer: COMMERCIAL

## 2022-10-14 VITALS
HEART RATE: 65 BPM | OXYGEN SATURATION: 97 % | BODY MASS INDEX: 28.6 KG/M2 | WEIGHT: 230 LBS | DIASTOLIC BLOOD PRESSURE: 68 MMHG | SYSTOLIC BLOOD PRESSURE: 132 MMHG | TEMPERATURE: 97.9 F | HEIGHT: 75 IN | RESPIRATION RATE: 18 BRPM

## 2022-10-14 DIAGNOSIS — M79.644 PAIN OF RIGHT THUMB: ICD-10-CM

## 2022-10-14 DIAGNOSIS — M77.8 THUMB TENDONITIS: ICD-10-CM

## 2022-10-14 PROCEDURE — 99213 OFFICE O/P EST LOW 20 MIN: CPT | Performed by: PHYSICIAN ASSISTANT

## 2022-10-14 PROCEDURE — 73140 X-RAY EXAM OF FINGER(S): CPT | Mod: RT

## 2022-10-14 ASSESSMENT — ENCOUNTER SYMPTOMS
FEVER: 0
VOMITING: 0
CHILLS: 0
SENSORY CHANGE: 0
TINGLING: 0
NAUSEA: 0

## 2022-10-14 NOTE — PROGRESS NOTES
Subjective     Cody Wilson is a 56 y.o. male who presents with Hand Injury (R thumb injury, swollen, sharp pains that radiate up into the wrist, moving an oven last night when injured. X last night )      HPI:  Cody Wilson is a 56 y.o. male who presents today for evaluation of right thumb pain.  Patient reports that he was using a small electronic screwdriver yesterday to remove a screw that was firmly embedded.  He said that he was torquing a screwdriver quite firmly for a few minutes.  Afterwards he went to pick something up with his right hand and had considerable pain in his right thumb.  He is also noticed some swelling.  He denies any previous injury.  No numbness or tingling.  He has occasional pain radiates into the wrist but for the most part it stays near the MCP joint.  He is right-hand dominant.      Review of Systems   Constitutional:  Negative for chills and fever.   Gastrointestinal:  Negative for nausea and vomiting.   Musculoskeletal:         Right thumb pain   Neurological:  Negative for tingling and sensory change.       PMH:  has a past medical history of Arthritis, Depression, and GERD (gastroesophageal reflux disease).  MEDS:   Current Outpatient Medications:     Ciclesonide (OMNARIS) 50 MCG/ACT Suspension, Administer 1 Act into affected nostril(S) every day. (Patient not taking: Reported on 10/14/2022), Disp: 1 Act, Rfl: 0    benzonatate (TESSALON) 200 MG capsule, Take 1 Capsule by mouth 3 times a day as needed for Cough. (Patient not taking: Reported on 10/14/2022), Disp: 45 Capsule, Rfl: 0    ibuprofen (MOTRIN) 200 MG Tab, Take 400 mg by mouth every 6 hours as needed. (Patient not taking: No sig reported), Disp: , Rfl:   ALLERGIES: No Known Allergies  SURGHX:   Past Surgical History:   Procedure Laterality Date    CT ENDOSCOPIC US EXAM, ESOPH  8/13/2020    Procedure: EGD, WITH ENDOSCOPIC US;  Surgeon: Man Coronado M.D.;  Location: SURGERY [facility];  Service:  "Gastroenterology    GASTROSCOPY-ENDO  8/13/2020    Procedure: GASTROSCOPY - WITH POSSIBLE BIOPSIES;  Surgeon: Man Coronado M.D.;  Location: SURGERY [facility];  Service: Gastroenterology    EGD WITH ASP/BX  8/13/2020    Procedure: EGD, WITH ASPIRATION BIOPSY - POSSIBLE(((Not done))));  Surgeon: Man Coronado M.D.;  Location: SURGERY [facility];  Service: Gastroenterology    GASTROSCOPY-ENDO  06/02/2020    COLONOSCOPY  2018    KNEE ARTHROSCOPY Right 2010    HERNIA REPAIR  2001    ventral    KNEE ARTHROTOMY Right 1985    [facility]     SOCHX:  reports that he has never smoked. He has never used smokeless tobacco. He reports that he does not drink alcohol and does not use drugs.  FH: Family history was reviewed, no pertinent findings to report        Objective     /68 (BP Location: Left arm, Patient Position: Sitting, BP Cuff Size: Adult)   Pulse 65   Temp 36.6 °C (97.9 °F) (Temporal)   Resp 18   Ht 1.905 m (6' 3\")   Wt 104 kg (230 lb)   SpO2 97%   BMI 28.75 kg/m²      Physical Exam  Constitutional:       General: He is not in acute distress.     Appearance: He is not diaphoretic.   HENT:      Head: Normocephalic and atraumatic.      Right Ear: External ear normal.      Left Ear: External ear normal.   Eyes:      Conjunctiva/sclera: Conjunctivae normal.      Pupils: Pupils are equal, round, and reactive to light.   Pulmonary:      Effort: Pulmonary effort is normal. No respiratory distress.   Musculoskeletal:      Cervical back: Normal range of motion.      Comments: Right hand: Proximal phalanx and MCP joint of first digit, as well as the first metacarpal, are diffusely tender to palpation.  There is some mild soft tissue swelling noted on the radial aspect of the right hand as well.  No tenderness over the wrist.  No scaphoid tenderness.  Negative Finkelstein's.  No overlying erythema or ecchymosis.     Skin:     Findings: No rash.   Neurological:      Mental Status: He is " alert and oriented to person, place, and time.   Psychiatric:         Mood and Affect: Mood and affect normal.         Cognition and Memory: Memory normal.         Judgment: Judgment normal.            DX-FINGER(S) 2+ RIGHT  FINDINGS:  There is no evidence of acute fracture, dislocation, or radiopaque foreign body.     There is very mild joint space narrowing with minimal marginal spurring of the 1st MCP joint. The IP joints are relatively well-preserved. Radiocarpal joint is preserved.     IMPRESSION:     1.  No acute fracture or dislocation of the right 1st digit.  2.  There is mild degenerative change in the right 1st MCP joint.    Assessment & Plan     1. Thumb tendonitis  - DX-FINGER(S) 2+ RIGHT; Future    -Right thumb spica splint applied  - Apply ice multiple times per day  - OTC NSAIDs  - Keep elevated as much as possible  - Follow up with PCP or return to the urgent care if no significant improvement after 4 to 5 days.              Differential Diagnosis, natural history, and supportive care discussed. Return to the Urgent Care or follow up with your PCP if symptoms fail to resolve, or for any new or worsening symptoms. Emergency room precautions discussed. Patient and/or family appears understanding of information.

## 2022-10-14 NOTE — LETTER
October 14, 2022         Patient: Cody Wilson   YOB: 1965   Date of Visit: 10/14/2022           To Whom it May Concern:    Cody Wilson was seen in my clinic on 10/14/2022. He will need to wear a right wrist brace for most of the next week.    If you have any questions or concerns, please don't hesitate to call.        Sincerely,           Eri Ramirez P.A.-C.  Electronically Signed

## 2023-03-04 ENCOUNTER — HOSPITAL ENCOUNTER (OUTPATIENT)
Dept: RADIOLOGY | Facility: MEDICAL CENTER | Age: 58
End: 2023-03-04
Attending: FAMILY MEDICINE
Payer: COMMERCIAL

## 2023-03-04 ENCOUNTER — OFFICE VISIT (OUTPATIENT)
Dept: URGENT CARE | Facility: PHYSICIAN GROUP | Age: 58
End: 2023-03-04
Payer: COMMERCIAL

## 2023-03-04 VITALS
TEMPERATURE: 98.2 F | HEART RATE: 75 BPM | DIASTOLIC BLOOD PRESSURE: 78 MMHG | HEIGHT: 75 IN | SYSTOLIC BLOOD PRESSURE: 126 MMHG | OXYGEN SATURATION: 99 % | RESPIRATION RATE: 18 BRPM | WEIGHT: 228 LBS | BODY MASS INDEX: 28.35 KG/M2

## 2023-03-04 DIAGNOSIS — M70.52 INFRAPATELLAR BURSITIS OF LEFT KNEE: ICD-10-CM

## 2023-03-04 DIAGNOSIS — M76.52 PATELLAR TENDINITIS OF LEFT KNEE: ICD-10-CM

## 2023-03-04 PROCEDURE — 73562 X-RAY EXAM OF KNEE 3: CPT | Mod: LT

## 2023-03-04 PROCEDURE — 99213 OFFICE O/P EST LOW 20 MIN: CPT | Performed by: FAMILY MEDICINE

## 2023-03-04 RX ORDER — MELOXICAM 7.5 MG/1
7.5 TABLET ORAL DAILY
Qty: 7 TABLET | Refills: 0 | Status: SHIPPED | OUTPATIENT
Start: 2023-03-04 | End: 2023-06-06

## 2023-03-04 NOTE — PROGRESS NOTES
Subjective     Cody Wilson is a 57 y.o. male who presents with Knee Pain (X 3 days, Left knee, sharp stabbing pain, constant, painful to the touch, any movements hurts )      - This is a pleasant and nontoxic appearing 57 y.o. male who has come to the walk-in clinic today for:    #1) 3 days ago went to knee down and right when knee got to  ground felt some pain in the knee. Has had pain since, worse w/ certain movements like getting in and out of chairs or going down stairs.       ALLERGIES:  Patient has no known allergies.     PMH:  Past Medical History:   Diagnosis Date    Arthritis     Feet, knees, hips, back    Depression     No meds    GERD (gastroesophageal reflux disease)     was treated with pantoprazole        PSH:  Past Surgical History:   Procedure Laterality Date    RI ENDOSCOPIC US EXAM, ESOPH  8/13/2020    Procedure: EGD, WITH ENDOSCOPIC US;  Surgeon: Man Coronado M.D.;  Location: [facility];  Service: Gastroenterology    GASTROSCOPY-ENDO  8/13/2020    Procedure: GASTROSCOPY - WITH POSSIBLE BIOPSIES;  Surgeon: Man Coronado M.D.;  Location: [facility];  Service: Gastroenterology    EGD WITH ASP/BX  8/13/2020    Procedure: EGD, WITH ASPIRATION BIOPSY - POSSIBLE(((Not done))));  Surgeon: Man Coronado M.D.;  Location: [facility];  Service: Gastroenterology    GASTROSCOPY-ENDO  06/02/2020    COLONOSCOPY  2018    KNEE ARTHROSCOPY Right 2010    HERNIA REPAIR  2001    ventral    KNEE ARTHROTOMY Right 1985    [facility]       MEDS:    Current Outpatient Medications:     meloxicam (MOBIC) 7.5 MG Tab, Take 1 Tablet by mouth every day., Disp: 7 Tablet, Rfl: 0    ** I have documented what I find to be significant in regards to past medical, social, family and surgical history  in my HPI or under PMH/PSH/FH review section, otherwise it is noncontributory **         HPI    Review of Systems   Musculoskeletal:  Positive for joint pain.   All  "other systems reviewed and are negative.           Objective     /78   Pulse 75   Temp 36.8 °C (98.2 °F) (Temporal)   Resp 18   Ht 1.905 m (6' 3\")   Wt 103 kg (228 lb)   SpO2 99%   BMI 28.50 kg/m²      Physical Exam  Vitals and nursing note reviewed.   Constitutional:       General: He is not in acute distress.     Appearance: Normal appearance. He is well-developed.   HENT:      Head: Normocephalic.   Pulmonary:      Effort: Pulmonary effort is normal. No respiratory distress.   Musculoskeletal:        Legs:       Comments: Lt knee: no deformity, wounds, discoloration, edema/effusions noted. Good strength and has full ext but can flex to 45deg when pain starts.    Neurological:      Mental Status: He is alert.      Motor: No abnormal muscle tone.   Psychiatric:         Mood and Affect: Mood normal.         Behavior: Behavior normal.         Assessment & Plan       1. Infrapatellar bursitis of left knee  DX-KNEE 3 VIEWS LEFT    Referral to Sports Medicine    meloxicam (MOBIC) 7.5 MG Tab      2. Patellar tendinitis of left knee  DX-KNEE 3 VIEWS LEFT    Referral to Sports Medicine    meloxicam (MOBIC) 7.5 MG Tab          - Dx, plan & d/c instructions discussed   - RICE  - Stay hydrated  - OTC Tylenol as needed      Follow up with Sports-Med provider in 1 week for a recheck on today's visit. ER if not improving in 2-3 days or if feeling/getting worse.     Any realistic side effects of medications that may have been given today reviewed.     Patient left in stable condition     Today's radiology imaging personally reviewed by me today on day of visit and Radiology readings reviewed and discussed w/ patient today.            "

## 2023-04-03 ENCOUNTER — TELEPHONE (OUTPATIENT)
Dept: HEALTH INFORMATION MANAGEMENT | Facility: OTHER | Age: 58
End: 2023-04-03
Payer: COMMERCIAL

## 2023-06-06 ENCOUNTER — OFFICE VISIT (OUTPATIENT)
Dept: URGENT CARE | Facility: PHYSICIAN GROUP | Age: 58
End: 2023-06-06
Payer: COMMERCIAL

## 2023-06-06 VITALS
DIASTOLIC BLOOD PRESSURE: 68 MMHG | OXYGEN SATURATION: 95 % | HEART RATE: 79 BPM | BODY MASS INDEX: 29.09 KG/M2 | RESPIRATION RATE: 18 BRPM | HEIGHT: 75 IN | TEMPERATURE: 98.8 F | WEIGHT: 234 LBS | SYSTOLIC BLOOD PRESSURE: 128 MMHG

## 2023-06-06 DIAGNOSIS — R10.13 EPIGASTRIC ABDOMINAL PAIN: Primary | ICD-10-CM

## 2023-06-06 PROCEDURE — 99213 OFFICE O/P EST LOW 20 MIN: CPT

## 2023-06-06 PROCEDURE — 3074F SYST BP LT 130 MM HG: CPT

## 2023-06-06 PROCEDURE — 3078F DIAST BP <80 MM HG: CPT

## 2023-06-06 RX ORDER — OMEPRAZOLE 40 MG/1
40 CAPSULE, DELAYED RELEASE ORAL DAILY
Qty: 14 CAPSULE | Refills: 0 | Status: SHIPPED | OUTPATIENT
Start: 2023-06-06 | End: 2023-06-20

## 2023-06-06 NOTE — PROGRESS NOTES
"Chief Complaint   Patient presents with    Abdominal Pain     X 3 days abdominal pain, not sleeping well, feels better laying on Lft side.         Subjective:   HISTORY OF PRESENT ILLNESS: Cody Wilson is a 57 y.o. male who presents for abdominal pain in the epigastric region and over his old hernia repair.  He reports it began on Sunday morning and was fairly constant until this morning when it lessened.   He describes it as a pressure and is worse at night or if he lays flat on his back.  feels better when he lays on his right side.  He denies aggravation with eating.  He had a ventral hernia repair in 2011, denies any heavy lifting or straining recently, has not noticed a bulge in the area.  He reports NSAID use, has not tried anything to releive is pain  Denies fever, chills, changes in bowel pattern, n/v/d, no blood in his stool, no back pain, no dysuria, or flank pain no CP or SOB.  Denies scrotal pain or swelling.  No Hx of gallstones or ETOH abuse      ROS See above HPI    Medications, Allergies, and current problem list reviewed today in Epic.     Objective:     /68 (BP Location: Left arm, Patient Position: Sitting, BP Cuff Size: Adult)   Pulse 79   Temp 37.1 °C (98.8 °F) (Temporal)   Resp 18   Ht 1.905 m (6' 3\")   Wt 106 kg (234 lb)   SpO2 95%     Physical Exam  Vitals reviewed.   Constitutional:       Appearance: Normal appearance.   HENT:      Mouth/Throat:      Mouth: Mucous membranes are moist.   Cardiovascular:      Rate and Rhythm: Normal rate.   Pulmonary:      Effort: Pulmonary effort is normal.   Abdominal:      General: Abdomen is flat. Bowel sounds are normal. There is no distension or abdominal bruit.      Palpations: Abdomen is soft. There is no mass or pulsatile mass.      Tenderness: There is abdominal tenderness in the epigastric area and periumbilical area. There is no right CVA tenderness, left CVA tenderness, guarding or rebound. Negative signs include Winslow's sign " and McBurney's sign.      Hernia: No hernia is present.      Comments: No pulsatile masses, no periumbilical or flank ecchymosis   Skin:     General: Skin is warm and dry.   Neurological:      Mental Status: He is alert and oriented to person, place, and time.   Psychiatric:         Mood and Affect: Mood normal.         Assessment/Plan:     Diagnosis and associated orders    1. Epigastric abdominal pain  omeprazole (PRILOSEC) 40 MG delayed-release capsule            Patient is well appearing and I suspect GERD or PUD, no sign of an acute abdomen, no signs of a new or worsening of his hernia. We discussed that this pain may be related to his hernia and additional imaging may be needed in the future but not indicated at this time.  No noticeable bulging.  His abd exam is re-assuring.   He is tolerating PO. Will trial Prilosec 40 mg for 2 weeks. Avoid NSAID use, spicy foods He is advised to make an appt with his PCP for F/U, return to clinic for no improvement or go to the ED with Red flag warnings as discussed.            Please note that this dictation was created using voice recognition software. I have made a reasonable attempt to correct obvious errors, but I expect that there are errors of grammar and possibly content that I did not discover before finalizing the note.    This note was electronically signed by ALEX Wei

## 2023-06-06 NOTE — LETTER
June 6, 2023    To Whom It May Concern:         This is confirmation that Cody Wilson attended his scheduled appointment with ALEX Wei on 6/06/23.         If you have any questions please do not hesitate to call me at the phone number listed below.    Sincerely,          IRVIN Wei.  [phone]

## 2025-01-23 ENCOUNTER — OFFICE VISIT (OUTPATIENT)
Dept: URGENT CARE | Facility: PHYSICIAN GROUP | Age: 60
End: 2025-01-23
Payer: COMMERCIAL

## 2025-01-23 VITALS
WEIGHT: 235.89 LBS | OXYGEN SATURATION: 95 % | RESPIRATION RATE: 14 BRPM | SYSTOLIC BLOOD PRESSURE: 130 MMHG | TEMPERATURE: 99.5 F | DIASTOLIC BLOOD PRESSURE: 80 MMHG | HEIGHT: 74 IN | BODY MASS INDEX: 30.27 KG/M2 | HEART RATE: 105 BPM

## 2025-01-23 DIAGNOSIS — J11.1 INFLUENZA-LIKE ILLNESS: ICD-10-CM

## 2025-01-23 PROCEDURE — 3079F DIAST BP 80-89 MM HG: CPT | Performed by: PHYSICIAN ASSISTANT

## 2025-01-23 PROCEDURE — 3075F SYST BP GE 130 - 139MM HG: CPT | Performed by: PHYSICIAN ASSISTANT

## 2025-01-23 PROCEDURE — 99213 OFFICE O/P EST LOW 20 MIN: CPT | Performed by: PHYSICIAN ASSISTANT

## 2025-01-23 RX ORDER — DEXTROMETHORPHAN HYDROBROMIDE AND PROMETHAZINE HYDROCHLORIDE 15; 6.25 MG/5ML; MG/5ML
5 SYRUP ORAL EVERY 4 HOURS PRN
Qty: 120 ML | Refills: 0 | Status: SHIPPED | OUTPATIENT
Start: 2025-01-23

## 2025-01-23 ASSESSMENT — ENCOUNTER SYMPTOMS
SORE THROAT: 1
PALPITATIONS: 0
MYALGIAS: 1
VOMITING: 0
CHILLS: 1
FEVER: 1
HEADACHES: 0
SHORTNESS OF BREATH: 0
DIZZINESS: 0
COUGH: 1
SPUTUM PRODUCTION: 0
WHEEZING: 0
SINUS PAIN: 0
NAUSEA: 0
DIARRHEA: 1
ABDOMINAL PAIN: 0
DIAPHORESIS: 0

## 2025-01-23 NOTE — LETTER
January 23, 2025    To Whom It May Concern:         This is confirmation that Cody Lundberg Wilson attended his scheduled appointment with Rasheed Daley P.A.-C. on 1/23/25.  Please excuse patient's absence from work on 1/21/2025 through 1/24/2025.         If you have any questions please do not hesitate to call me at the phone number listed below.    Sincerely,          Rasheed Daley P.A.-C.  [phone]

## 2025-01-23 NOTE — PROGRESS NOTES
Subjective:     CHIEF COMPLAINT  Chief Complaint   Patient presents with    Influenza     Wife has flu   Has been feeling flu like symptoms since Monday   Has had stomach issues   P/t may need a work note          HPI  Cody Wilson is a very pleasant 59 y.o. male who presents to the clinic with flulike symptoms x 3-4 days.  Wife recently tested positive for influenza.  Patient has been experiencing generalized chills, body aches, subjective fever, cough, congestion and sore throat.  Yesterday he had a few bouts of diarrhea.  No associated abdominal pain, nausea or vomiting.  Has been taking Amber-Barnard cold and flu as well as Pepto-Bismol for symptoms.  Requesting a note for work.    REVIEW OF SYSTEMS  Review of Systems   Constitutional:  Positive for chills, fever and malaise/fatigue. Negative for diaphoresis.   HENT:  Positive for congestion and sore throat. Negative for ear pain and sinus pain.    Respiratory:  Positive for cough. Negative for sputum production, shortness of breath and wheezing.    Cardiovascular:  Negative for chest pain and palpitations.   Gastrointestinal:  Positive for diarrhea. Negative for abdominal pain, nausea and vomiting.   Musculoskeletal:  Positive for myalgias.   Neurological:  Negative for dizziness and headaches.       PAST MEDICAL HISTORY  Patient Active Problem List    Diagnosis Date Noted    GERD (gastroesophageal reflux disease) 08/13/2020       SURGICAL HISTORY   has a past surgical history that includes hernia repair (2001); knee arthrotomy (Right, 1985); knee arthroscopy (Right, 2010); gastroscopy-endo (06/02/2020); colonoscopy (2018); endoscopic us exam, esoph (8/13/2020); gastroscopy-endo (8/13/2020); and egd with asp/bx (8/13/2020).    ALLERGIES  Allergies   Allergen Reactions    Dairy Food Allergy Unspecified     unspecied       CURRENT MEDICATIONS  Home Medications       Reviewed by Rasheed Daley P.A.-C. (Physician Assistant) on 01/23/25 at 3760  Med List  "Status: <None>     Medication Last Dose Status        Patient Gerardo Taking any Medications                           SOCIAL HISTORY  Social History     Tobacco Use    Smoking status: Never    Smokeless tobacco: Never   Vaping Use    Vaping status: Never Used   Substance and Sexual Activity    Alcohol use: Never    Drug use: Never    Sexual activity: Yes     Partners: Female       FAMILY HISTORY  Family History   Problem Relation Age of Onset    Bipolar disorder Mother     Depression Mother     Depression Sister     Dementia Paternal Grandmother           Objective:     VITAL SIGNS: /80 (BP Location: Left arm, Patient Position: Sitting, BP Cuff Size: Adult)   Pulse (!) 105   Temp 37.5 °C (99.5 °F) (Temporal)   Resp 14   Ht 1.88 m (6' 2\")   Wt 107 kg (235 lb 14.3 oz)   SpO2 95%   BMI 30.29 kg/m²     PHYSICAL EXAM  Physical Exam  Constitutional:       General: He is not in acute distress.     Appearance: Normal appearance. He is not ill-appearing, toxic-appearing or diaphoretic.   HENT:      Head: Normocephalic and atraumatic.      Right Ear: Tympanic membrane, ear canal and external ear normal.      Left Ear: Tympanic membrane, ear canal and external ear normal.      Nose: Congestion and rhinorrhea present.      Mouth/Throat:      Mouth: Mucous membranes are moist.      Pharynx: Posterior oropharyngeal erythema present. No oropharyngeal exudate.   Eyes:      Conjunctiva/sclera: Conjunctivae normal.   Cardiovascular:      Rate and Rhythm: Regular rhythm. Tachycardia present.      Pulses: Normal pulses.      Heart sounds: Normal heart sounds.   Pulmonary:      Effort: Pulmonary effort is normal.      Breath sounds: Normal breath sounds. No wheezing, rhonchi or rales.   Musculoskeletal:      Cervical back: Normal range of motion. No muscular tenderness.   Lymphadenopathy:      Cervical: No cervical adenopathy.   Skin:     General: Skin is warm and dry.      Capillary Refill: Capillary refill takes less than " 2 seconds.   Neurological:      Mental Status: He is alert.   Psychiatric:         Mood and Affect: Mood normal.         Thought Content: Thought content normal.         Assessment/Plan:     1. Influenza-like illness  - promethazine-dextromethorphan (PROMETHAZINE-DM) 6.25-15 MG/5ML syrup; Take 5 mL by mouth every four hours as needed for Cough.  Dispense: 120 mL; Refill: 0      MDM/Comments:    -Discussed viral etiology of URI.     Symptomatic care.  -Oral hydration and rest.   -Over the counter expectorant as directed; Guaifenesin (Mucinex).  -Diphenhydramine as directed for rhinorrhea (runny nose) and sneezing.  --May take over the counter pseudoephedrine for nasal congestion. Can increase your blood pressure.  -Tylenol or ibuprofen for pain and fever as directed.   -Saline nasal spray as a decongestant.    Follow up for shortness of breath, fevers, elevated heart rate, weakness, prolonged cough, chest pain, or any other concerns.      Differential diagnosis, natural history, supportive care, and indications for immediate follow-up discussed. All questions answered. Patient agrees with the plan of care.    Follow-up as needed if symptoms worsen or fail to improve to PCP, Urgent care or Emergency Room.    I have personally reviewed prior external notes and test results pertinent to today's visit.  I have independently reviewed and interpreted all diagnostics ordered during this urgent care acute visit.   Discussed management options (risks,benefits, and alternatives to treatment). Pt expresses understanding and the treatment plan was agreed upon. Questions were encouraged and answered to pt's satisfaction.    Please note that this dictation was created using voice recognition software. I have made a reasonable attempt to correct obvious errors, but I expect that there are errors of grammar and possibly content that I did not discover before finalizing the note.

## 2025-04-10 ENCOUNTER — HOSPITAL ENCOUNTER (OUTPATIENT)
Dept: RADIOLOGY | Facility: MEDICAL CENTER | Age: 60
End: 2025-04-10
Payer: COMMERCIAL

## 2025-04-10 ENCOUNTER — OFFICE VISIT (OUTPATIENT)
Dept: URGENT CARE | Facility: PHYSICIAN GROUP | Age: 60
End: 2025-04-10
Payer: COMMERCIAL

## 2025-04-10 VITALS
WEIGHT: 244.6 LBS | SYSTOLIC BLOOD PRESSURE: 130 MMHG | BODY MASS INDEX: 30.41 KG/M2 | TEMPERATURE: 97.7 F | HEART RATE: 90 BPM | HEIGHT: 75 IN | DIASTOLIC BLOOD PRESSURE: 88 MMHG | RESPIRATION RATE: 16 BRPM | OXYGEN SATURATION: 96 %

## 2025-04-10 DIAGNOSIS — M79.674 TOE PAIN, RIGHT: ICD-10-CM

## 2025-04-10 DIAGNOSIS — M10.9 ACUTE GOUT INVOLVING TOE OF RIGHT FOOT, UNSPECIFIED CAUSE: ICD-10-CM

## 2025-04-10 PROCEDURE — 3075F SYST BP GE 130 - 139MM HG: CPT

## 2025-04-10 PROCEDURE — 73630 X-RAY EXAM OF FOOT: CPT | Mod: RT

## 2025-04-10 PROCEDURE — 99213 OFFICE O/P EST LOW 20 MIN: CPT

## 2025-04-10 PROCEDURE — 3079F DIAST BP 80-89 MM HG: CPT

## 2025-04-10 RX ORDER — PREDNISONE 20 MG/1
30 TABLET ORAL DAILY
Qty: 8 TABLET | Refills: 0 | Status: SHIPPED | OUTPATIENT
Start: 2025-04-10 | End: 2025-04-15

## 2025-04-10 NOTE — PROGRESS NOTES
"Subjective     Cody Wilson is a 59 y.o. male who presents with Foot Swelling (Right foot swelling, pain x 1 day )    Onset x 1 day.  Patient reports right big toe pain and swelling.  He reports this is very similar to prior gout flare several years ago.  Denies trauma, overuse, or injuries of the region.  No fever/chills.  He does report he has consumed more shellfish prior to onset.  Prior treatments include ibuprofen. No other aggravating or alleviating factors. Denies hx of immunocompromise, denies current tx for infection, anticoagulant use, recent surgeries, kidney disease, or poorly controlled DM.           Review of Systems   All other systems reviewed and are negative.    Medications:    This patient does not have an active medication from one of the medication groupers.    Allergies:  Dairy food allergy    Past Social Hx:  Cody Wilson  reports that he has never smoked. He has never used smokeless tobacco. He reports that he does not drink alcohol and does not use drugs.    Past Medical Hx:   Past Medical History:   Diagnosis Date    Arthritis     Feet, knees, hips, back    Depression     No meds    GERD (gastroesophageal reflux disease)     was treated with pantoprazole             Objective     /88   Pulse 90   Temp 36.5 °C (97.7 °F) (Temporal)   Resp 16   Ht 1.905 m (6' 3\")   Wt 111 kg (244 lb 9.6 oz)   SpO2 96%   BMI 30.57 kg/m²      Physical Exam  Vitals reviewed.   Constitutional:       Appearance: Normal appearance.   Cardiovascular:      Rate and Rhythm: Normal rate.   Pulmonary:      Effort: Pulmonary effort is normal.   Musculoskeletal:        Feet:    Feet:      Comments: Warmth, swelling and tenderness of MCP joint. No erythema. FROM. Sensation and strength intact throughout.  Skin:     General: Skin is warm and dry.      Capillary Refill: Capillary refill takes less than 2 seconds.   Neurological:      Mental Status: He is alert.   Psychiatric:         Behavior: " Behavior normal.                    RADIOLOGY RESULTS   DX-FOOT-COMPLETE 3+ RIGHT  Result Date: 4/10/2025  4/10/2025 11:16 AM HISTORY/REASON FOR EXAM:  Atraumatic Pain/Swelling/Deformity.  RIGHT foot pain.  History of gout. TECHNIQUE/EXAM DESCRIPTION AND NUMBER OF VIEWS: 3 views of the RIGHT foot. COMPARISON:  None. FINDINGS: No focal soft tissue swelling or calcifications. No fracture or dislocation. Focal erosion is seen at the base of the 1st proximal phalanx medially.     1.  No fracture or dislocation of RIGHT foot. 2.  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                   Assessment & Plan  Acute gout involving toe of right foot, unspecified cause    Orders:    predniSONE (DELTASONE) 20 MG Tab; Take 1.5 Tablets by mouth every day for 5 days.    Toe pain, right    Orders:    DX-FOOT-COMPLETE 3+ RIGHT; Future    High suspicion for acute gout flare given presentation and prior exacerbations in the past.  He reports prior flare of the same joint with the same exact trigger of shellfish. Drinks tea routinely, recommend drinking lots of water/ensuring adequate hydration. No recent uric acid level, he does have an upcoming follow-up with primary care provider, recommend follow-up and repeating labs.     Discussed management options (risks, benefits, and alternatives to treatment). Pt/parent/guardian expresses understanding and the treatment plan was agreed upon.     Differential diagnosis, natural history, supportive care, and indications for immediate follow-up discussed. Advised the patient to follow-up with PCP or RTC for recheck, reevaluation, and consideration of further management. Instructed to go to the nearest Emergency Department or call 911 if symptoms fail to improve, for any change in condition, further concerns, or new concerning symptoms.     Electronically signed by   Kelly Lundberg DNP, MARGI, MATTIE-BC

## 2025-04-10 NOTE — LETTER
[facility] [address] [address]     April 10, 2025    Patient: Cody Wilson   YOB: 1965   Date of Visit: 4/10/2025       To Whom It May Concern:    Cody Wilson was seen and treated in our department on 4/10/2025. Please excuse patient absence today. He may return to work 4/13 if symptoms have improved.     Sincerely,     Kelly Lundberg DNP, APRN, FNP-BC

## 2025-04-17 ENCOUNTER — RESULTS FOLLOW-UP (OUTPATIENT)
Dept: URGENT CARE | Facility: PHYSICIAN GROUP | Age: 60
End: 2025-04-17

## 2025-04-17 ENCOUNTER — OFFICE VISIT (OUTPATIENT)
Dept: URGENT CARE | Facility: PHYSICIAN GROUP | Age: 60
End: 2025-04-17
Payer: COMMERCIAL

## 2025-04-17 ENCOUNTER — HOSPITAL ENCOUNTER (OUTPATIENT)
Dept: LAB | Facility: MEDICAL CENTER | Age: 60
End: 2025-04-17
Attending: NURSE PRACTITIONER
Payer: COMMERCIAL

## 2025-04-17 VITALS
BODY MASS INDEX: 30.67 KG/M2 | OXYGEN SATURATION: 98 % | HEIGHT: 75 IN | WEIGHT: 246.69 LBS | RESPIRATION RATE: 16 BRPM | HEART RATE: 79 BPM | SYSTOLIC BLOOD PRESSURE: 132 MMHG | DIASTOLIC BLOOD PRESSURE: 84 MMHG | TEMPERATURE: 97.2 F

## 2025-04-17 DIAGNOSIS — M11.271 PSEUDOGOUT OF FOOT, RIGHT: ICD-10-CM

## 2025-04-17 LAB — URATE SERPL-MCNC: 7 MG/DL (ref 2.5–8.3)

## 2025-04-17 PROCEDURE — 84550 ASSAY OF BLOOD/URIC ACID: CPT

## 2025-04-17 PROCEDURE — 99214 OFFICE O/P EST MOD 30 MIN: CPT | Performed by: NURSE PRACTITIONER

## 2025-04-17 PROCEDURE — 3075F SYST BP GE 130 - 139MM HG: CPT | Performed by: NURSE PRACTITIONER

## 2025-04-17 PROCEDURE — 3079F DIAST BP 80-89 MM HG: CPT | Performed by: NURSE PRACTITIONER

## 2025-04-17 PROCEDURE — 36415 COLL VENOUS BLD VENIPUNCTURE: CPT

## 2025-04-17 RX ORDER — METHYLPREDNISOLONE 4 MG/1
TABLET ORAL
Qty: 21 TABLET | Refills: 0 | Status: SHIPPED | OUTPATIENT
Start: 2025-04-17

## 2025-04-17 ASSESSMENT — ENCOUNTER SYMPTOMS
MYALGIAS: 1
CONSTITUTIONAL NEGATIVE: 1
FALLS: 0

## 2025-04-17 NOTE — PROGRESS NOTES
Subjective     Cody Wilson is a 59 y.o. male who presents with Gout (Gout pain , right foot , following up from 4/10 ,ran out of medication and has come back )            HPI  Cody has come into urgent care today as he has been experiencing acute right big toe swelling, pain.  States he was in urgent care 1 week ago and was placed on prednisone once daily for 5 days.  States that did help but pain has returned.  States did have a gout symptoms approximately 11 years ago.  He has not taking any preventative for this.  X-ray was done at last urgent care visit which showed pseudo gout.  Has tried over-the-counter ibuprofen but states did not help.  No foot soaking.  No noted injury.    PMH:  has a past medical history of Arthritis, Depression, and GERD (gastroesophageal reflux disease).  MEDS:   Current Outpatient Medications:     methylPREDNISolone (MEDROL DOSEPAK) 4 MG Tablet Therapy Pack, Follow schedule on package instructions., Disp: 21 Tablet, Rfl: 0  ALLERGIES:   Allergies   Allergen Reactions    Dairy Food Allergy Unspecified     unspecied     SURGHX:   Past Surgical History:   Procedure Laterality Date    GA ENDOSCOPIC US EXAM, ESOPH  8/13/2020    Procedure: EGD, WITH ENDOSCOPIC US;  Surgeon: Man Coronado M.D.;  Location: [facility];  Service: Gastroenterology    GASTROSCOPY-ENDO  8/13/2020    Procedure: GASTROSCOPY - WITH POSSIBLE BIOPSIES;  Surgeon: Man Coronado M.D.;  Location: [facility];  Service: Gastroenterology    EGD WITH ASP/BX  8/13/2020    Procedure: EGD, WITH ASPIRATION BIOPSY - POSSIBLE(((Not done))));  Surgeon: Man Coronado M.D.;  Location: [facility];  Service: Gastroenterology    GASTROSCOPY-ENDO  06/02/2020    COLONOSCOPY  2018    KNEE ARTHROSCOPY Right 2010    HERNIA REPAIR  2001    ventral    KNEE ARTHROTOMY Right 1985    [facility] Barrientos     SOCHX:  reports that he has never smoked. He has never used smokeless tobacco.  "He reports that he does not drink alcohol and does not use drugs.  FH: Family history was reviewed, no pertinent findings to report    Review of Systems   Constitutional: Negative.    Musculoskeletal:  Positive for joint pain and myalgias. Negative for falls.   All other systems reviewed and are negative.             Objective     /84   Pulse 79   Temp 36.2 °C (97.2 °F) (Temporal)   Resp 16   Ht 1.905 m (6' 3\")   Wt 112 kg (246 lb 11.1 oz)   SpO2 98%   BMI 30.83 kg/m²      Physical Exam  Vitals reviewed.   Constitutional:       General: He is awake. He is not in acute distress.  Cardiovascular:      Rate and Rhythm: Normal rate.   Pulmonary:      Effort: Pulmonary effort is normal.   Musculoskeletal:      Right foot: Normal range of motion and normal capillary refill. Swelling, tenderness and bony tenderness present. No deformity, bunion, Charcot foot, foot drop, prominent metatarsal heads or crepitus. Normal pulse.        Feet:    Feet:      Right foot:      Skin integrity: Skin integrity normal.      Toenail Condition: Right toenails are normal.   Skin:     General: Skin is warm and dry.      Findings: No abrasion, bruising, erythema, signs of injury, laceration, rash or wound.   Neurological:      Mental Status: He is alert and oriented to person, place, and time.      Gait: Gait is intact.   Psychiatric:         Behavior: Behavior normal. Behavior is cooperative.                                  Assessment & Plan  Pseudogout of foot, right    Orders:    methylPREDNISolone (MEDROL DOSEPAK) 4 MG Tablet Therapy Pack; Follow schedule on package instructions.    URIC ACID; Future  -Avoid ibuprofen use with oral steroids at this time, may take over-the-counter Tylenol if needed for additional pain relief  -Epsom salt soaks as needed for swelling  -Return to UC if symptoms worsen or change  - Follow-up with primary care provider regarding management of pseudogout    MyChart release of uric acid results, " patient notified      -Education provided: see above    -Return to urgent care as needed if new or worsening symptoms  -Patient expresses understanding to treatment and plan of care  -Questions were encouraged and answered  -Recommended over the counter meds were written down when requested   -Advised to follow-up with the primary care provider for recheck, reevaluation, and consideration of further management as needed     -Time spent evaluating this patient was at least 30 minutes. This time comprises of the following: preparing for visit/chart review, patient history taken into account pertinent to symptoms, patient counseling/education for needed treatment outpatient, exam/evaluation/treatment plan provided, ordering any appropriate lab/test/procedures/meds, independent interpretation of any clinic testing, medication management with any other listed medications and chart documentation.

## 2025-04-17 NOTE — LETTER
April 17, 2025       Patient: Cody Wilson   YOB: 1965   Date of Visit: 4/17/2025         To Whom It May Concern:    In my medical opinion, I recommend that Cody Wilson be excused from work today as he was evaluated in clinic.    If you have any questions or concerns, please don't hesitate to call [phone]          Sincerely,          IRVIN Lomeli.  Electronically Signed

## 2025-05-07 ENCOUNTER — HOSPITAL ENCOUNTER (OUTPATIENT)
Dept: LAB | Facility: MEDICAL CENTER | Age: 60
End: 2025-05-07
Attending: FAMILY MEDICINE
Payer: COMMERCIAL

## 2025-05-07 LAB
ALBUMIN SERPL BCP-MCNC: 4 G/DL (ref 3.2–4.9)
ALBUMIN/GLOB SERPL: 1.4 G/DL
ALP SERPL-CCNC: 83 U/L (ref 30–99)
ALT SERPL-CCNC: 17 U/L (ref 2–50)
ANION GAP SERPL CALC-SCNC: 11 MMOL/L (ref 7–16)
AST SERPL-CCNC: 20 U/L (ref 12–45)
BILIRUB SERPL-MCNC: 0.7 MG/DL (ref 0.1–1.5)
BUN SERPL-MCNC: 17 MG/DL (ref 8–22)
CALCIUM ALBUM COR SERPL-MCNC: 9.3 MG/DL (ref 8.5–10.5)
CALCIUM SERPL-MCNC: 9.3 MG/DL (ref 8.5–10.5)
CHLORIDE SERPL-SCNC: 106 MMOL/L (ref 96–112)
CO2 SERPL-SCNC: 24 MMOL/L (ref 20–33)
CREAT SERPL-MCNC: 0.92 MG/DL (ref 0.5–1.4)
GFR SERPLBLD CREATININE-BSD FMLA CKD-EPI: 96 ML/MIN/1.73 M 2
GLOBULIN SER CALC-MCNC: 2.8 G/DL (ref 1.9–3.5)
GLUCOSE SERPL-MCNC: 97 MG/DL (ref 65–99)
POTASSIUM SERPL-SCNC: 4.7 MMOL/L (ref 3.6–5.5)
PROT SERPL-MCNC: 6.8 G/DL (ref 6–8.2)
SODIUM SERPL-SCNC: 141 MMOL/L (ref 135–145)

## 2025-05-07 PROCEDURE — 80053 COMPREHEN METABOLIC PANEL: CPT

## 2025-05-07 PROCEDURE — 36415 COLL VENOUS BLD VENIPUNCTURE: CPT

## (undated) DEVICE — SYRINGE SAFETY 5 ML 18 GA X 1-1/2 BLUNT LL (100/BX 4BX/CA)

## (undated) DEVICE — TUBE E-T HI-LO CUFF 7.5MM (10EA/PK)

## (undated) DEVICE — TUBE E-T HI-LO CUFF 6.5MM (10EA/BX)

## (undated) DEVICE — SYRINGE SAFETY 10 ML 18 GA X 1 1/2 BLUNT LL (100/BX 4BX/CA)

## (undated) DEVICE — TUBE E-T HI-LO CUFF 8.0MM (10EA/PK)

## (undated) DEVICE — KIT ANESTHESIA W/CIRCUIT & 3/LT BAG W/FILTER (20EA/CA)

## (undated) DEVICE — CANISTER SUCTION RIGID RED 1500CC (40EA/CA)

## (undated) DEVICE — TUBE E-T HI-LO CUFF 8.5MM (10EA/PK)

## (undated) DEVICE — TUBE CONNECTING SUCTION - CLEAR PLASTIC STERILE 72 IN (50EA/CA)

## (undated) DEVICE — KIT  I.V. START (100EA/CA)

## (undated) DEVICE — TUBE SUCTION YANKAUER  1/4 X 6FT (20EA/CA)"

## (undated) DEVICE — WATER IRRIGATION STERILE 1000ML (12EA/CA)

## (undated) DEVICE — TRAP POLYP E-TRAP (25EA/BX)

## (undated) DEVICE — LACTATED RINGERS INJ 1000 ML - (14EA/CA 60CA/PF)

## (undated) DEVICE — GOWN SURGEONS LARGE - (32/CA)

## (undated) DEVICE — CATHETER IV SAFETY 22 GA X 1 (50EA/BX)

## (undated) DEVICE — BITE BLOCK ADULT 60FR (100EA/CA)

## (undated) DEVICE — MASK WITH FACE SHIELD (25/BX 4BX/CA)

## (undated) DEVICE — GLOVE, LITE (PAIR)

## (undated) DEVICE — CATHETER IV SAFETY 20 GA X 1-1/4 (50/BX)

## (undated) DEVICE — SNARE EMR STD SCOPE CAPTIVATOR (1 EA)

## (undated) DEVICE — SYRINGE DISP. 60 CC LL - (30/BX, 12BX/CA)**WHEN THESE ARE GONE ORDER #500206**

## (undated) DEVICE — DEVICE HEMOSTATIC CLIPPING RESOLUTION 360 DEGREES (20EA/BX)

## (undated) DEVICE — SENSOR SPO2 ADULT LNCS ADTX (20/BX) ORDER ITEM #19593

## (undated) DEVICE — RESCUE RETRIEVAL NET (5EA/BX)

## (undated) DEVICE — MASK ANESTHESIA ADULT  - (100/CA)

## (undated) DEVICE — TUBE E-T HI-LO CUFF 7.0MM (10EA/PK)

## (undated) DEVICE — NEPTUNE 4 PORT MANIFOLD - (20/PK)

## (undated) DEVICE — KIT CUSTOM PROCEDURE SINGLE FOR ENDO  (15/CA)

## (undated) DEVICE — SYRINGE SAFETY 3 ML 18 GA X 1 1/2 BLUNT LL (100/BX 8BX/CA)

## (undated) DEVICE — FORCEP RADIAL JAW 4 STANDARD CAPACITY W/NEEDLE 240CM (40EA/BX)